# Patient Record
Sex: MALE | Race: WHITE | Employment: UNEMPLOYED | URBAN - METROPOLITAN AREA
[De-identification: names, ages, dates, MRNs, and addresses within clinical notes are randomized per-mention and may not be internally consistent; named-entity substitution may affect disease eponyms.]

---

## 2017-01-27 ENCOUNTER — ANTICOAGULATION THERAPY VISIT (OUTPATIENT)
Dept: NURSING | Facility: CLINIC | Age: 57
End: 2017-01-27
Payer: COMMERCIAL

## 2017-01-27 DIAGNOSIS — Z79.01 LONG-TERM (CURRENT) USE OF ANTICOAGULANTS: Primary | ICD-10-CM

## 2017-01-27 LAB — INR POINT OF CARE: 2.5 (ref 0.86–1.14)

## 2017-01-27 PROCEDURE — 36416 COLLJ CAPILLARY BLOOD SPEC: CPT

## 2017-01-27 PROCEDURE — 99207 ZZC NO CHARGE NURSE ONLY: CPT

## 2017-01-27 PROCEDURE — 85610 PROTHROMBIN TIME: CPT | Mod: QW

## 2017-01-27 NOTE — PROGRESS NOTES
ANTICOAGULATION FOLLOW-UP CLINIC VISIT    Patient Name:  Nikunj Anthony  Date:  1/27/2017  Contact Type:  Face to Face    SUBJECTIVE:     Patient Findings     Positives No Problem Findings           OBJECTIVE    INR PROTIME   Date Value Ref Range Status   01/27/2017 2.5* 0.86 - 1.14 Final       ASSESSMENT / PLAN  INR assessment THER    Recheck INR In: 6 WEEKS    INR Location Clinic      Anticoagulation Summary as of 1/27/2017     INR goal 2.0-3.0   Selected INR 2.5 (1/27/2017)   Maintenance plan 5 mg (5 mg x 1) every day   Full instructions 5 mg every day   Weekly total 35 mg   No change documented Leticia Antunez, KODY   Plan last modified Leticia Antunez RN (12/23/2016)   Next INR check 3/10/2017   Priority INR   Target end date Indefinite    Indications   Long-term (current) use of anticoagulants [Z79.01] [Z79.01]  Atrial fibrillation with rapid ventricular response (H) (Resolved) [I48.91]         Anticoagulation Episode Summary     INR check location     Preferred lab     Send INR reminders to [facility]    Comments       Anticoagulation Care Providers     Provider Role Specialty Phone number    Fred Del Valle MD [facility] [phone]            See the Encounter Report to view Anticoagulation Flowsheet and Dosing Calendar (Go to Encounters tab in chart review, and find the Anticoagulation Therapy Visit)        Leticia Antunez, RN

## 2017-03-10 DIAGNOSIS — I10 ESSENTIAL HYPERTENSION, BENIGN: ICD-10-CM

## 2017-03-10 DIAGNOSIS — I50.33 ACUTE ON CHRONIC DIASTOLIC CONGESTIVE HEART FAILURE (H): ICD-10-CM

## 2017-03-10 NOTE — LETTER
[address] [id number] [phone]          March 14, 2017    Nikunj Anthony                                                                                                                     [id number] [address] [facility] [facility] [address]            Dear Nikunj,    We recently received a call from your pharmacy requesting a refill of your medication (METOPROLOL).    A review of your chart indicates that an appointment is required.  Please contact our office at [phone] to schedule your ANNUAL doctor's appointment.    We have authorized one refill of your medication to allow time for you to schedule your appointment.    Taking care of your health is important to us and ongoing visits with your provider are vital to your care.  We look forward to seeing you in the near future.    Sincerely,    HORTENCIA CHINO MD/Mallika MARSHALL

## 2017-03-10 NOTE — TELEPHONE ENCOUNTER
metoprolol (LOPRESSOR) 50 MG tablet     Last Written Prescription Date: 12/20/16  Last Fill Quantity: 180, # refills: 2    Last Office Visit with GEOVANNA, URBANO or [facility] prescribing provider:  Ivon 3/25/16     Future Office Visit:        BP Readings from Last 3 Encounters:   06/17/16 105/71   04/28/16 118/74   03/28/16 100/72

## 2017-03-14 RX ORDER — METOPROLOL TARTRATE 50 MG
TABLET ORAL
Qty: 180 TABLET | Refills: 0 | Status: SHIPPED | OUTPATIENT
Start: 2017-03-14 | End: 2017-03-31

## 2017-03-14 NOTE — TELEPHONE ENCOUNTER
Due for annual visit, #30 sent, pt due for annual  Prescription approved per [facility] Refill Protocol.  Mallika Sharif, RN, BSN

## 2017-03-16 ENCOUNTER — ANTICOAGULATION THERAPY VISIT (OUTPATIENT)
Dept: NURSING | Facility: CLINIC | Age: 57
End: 2017-03-16
Payer: COMMERCIAL

## 2017-03-16 DIAGNOSIS — Z79.01 LONG-TERM (CURRENT) USE OF ANTICOAGULANTS: ICD-10-CM

## 2017-03-16 LAB — INR POINT OF CARE: 2.5 (ref 0.86–1.14)

## 2017-03-16 PROCEDURE — 36416 COLLJ CAPILLARY BLOOD SPEC: CPT

## 2017-03-16 PROCEDURE — 85610 PROTHROMBIN TIME: CPT | Mod: QW

## 2017-03-16 PROCEDURE — 99207 ZZC NO CHARGE NURSE ONLY: CPT

## 2017-03-16 NOTE — PROGRESS NOTES
ANTICOAGULATION FOLLOW-UP CLINIC VISIT    Patient Name:  Nikunj Anthony  Date:  3/16/2017  Contact Type:  Face to Face    SUBJECTIVE:     Patient Findings     Positives Other complaints (Had URI about 2 weeks ago, he took Coricidin prn)           OBJECTIVE    INR Protime   Date Value Ref Range Status   03/16/2017 2.5 (A) 0.86 - 1.14 Final       ASSESSMENT / PLAN  INR assessment THER    Recheck INR In: 6 WEEKS    INR Location Clinic      Anticoagulation Summary as of 3/16/2017     INR goal 2.0-3.0   Today's INR 2.5   Maintenance plan 5 mg (5 mg x 1) every day   Full instructions 5 mg every day   Weekly total 35 mg   No change documented Leticia Antunez RN   Plan last modified Leticia Antunez RN (12/23/2016)   Next INR check 4/27/2017   Priority INR   Target end date Indefinite    Indications   Long-term (current) use of anticoagulants [Z79.01] [Z79.01]  Atrial fibrillation with rapid ventricular response (H) (Resolved) [I48.91]         Anticoagulation Episode Summary     INR check location     Preferred lab     Send INR reminders to [facility]    Comments       Anticoagulation Care Providers     Provider Role Specialty Phone number    Fred Del Valle MD [facility] [phone]            See the Encounter Report to view Anticoagulation Flowsheet and Dosing Calendar (Go to Encounters tab in chart review, and find the Anticoagulation Therapy Visit)        Leticia Antunez, RN

## 2017-03-16 NOTE — MR AVS SNAPSHOT
Nikunj Gabrielle   3/16/2017 1:15 PM   Anticoagulation Therapy Visit    Description:  56 year old male   Provider:  [facility]   Department:  Cr Nurse           INR as of 3/16/2017     Today's INR 2.5      Anticoagulation Summary as of 3/16/2017     INR goal 2.0-3.0   Today's INR 2.5   Full instructions 5 mg every day   Next INR check 4/27/2017    Indications   Long-term (current) use of anticoagulants [Z79.01] [Z79.01]  Atrial fibrillation with rapid ventricular response (H) (Resolved) [I48.91]         Your next Anticoagulation Clinic appointment(s)     Apr 27, 2017  1:15 PM CDT   Anticoagulation Visit with [facility]   [facility]    [address] [place] [address] [phone]              Contact Numbers     Clinic Number:         March 2017 Details    Sun Mon Tue Wed Thu Fri Sat        1               2               3               4                 5               6               7               8               9               10               11                 12               13               14               15               16      5 mg   See details      17      5 mg         18      5 mg           19      5 mg         20      5 mg         21      5 mg         22      5 mg         23      5 mg         24      5 mg         25      5 mg           26      5 mg         27      5 mg         28      5 mg         29      5 mg         30      5 mg         31      5 mg           Date Details   03/16 This INR check               How to take your warfarin dose     To take:  5 mg Take 1 of the 5 mg tablets.           April 2017 Details    Sun Mon Tue Wed Thu Fri Sat           1      5 mg           2      5 mg         3      5 mg         4      5 mg         5      5 mg         6      5 mg         7      5 mg         8      5 mg           9      5 mg         10      5 mg         11      5 mg         12      5 mg          13      5 mg         14      5 mg         15      5 mg           16      5 mg         17      5 mg         18      5 mg         19      5 mg         20      5 mg         21      5 mg         22      5 mg           23      5 mg         24      5 mg         25      5 mg         26      5 mg         27            28               29                 30                      Date Details   No additional details    Date of next INR:  4/27/2017         How to take your warfarin dose     To take:  5 mg Take 1 of the 5 mg tablets.

## 2017-03-20 ENCOUNTER — TELEPHONE (OUTPATIENT)
Dept: NURSING | Facility: CLINIC | Age: 57
End: 2017-03-20

## 2017-03-20 DIAGNOSIS — I48.20 CHRONIC ATRIAL FIBRILLATION (H): ICD-10-CM

## 2017-03-20 RX ORDER — SIMVASTATIN 20 MG
20 TABLET ORAL AT BEDTIME
Qty: 30 TABLET | Refills: 0 | Status: SHIPPED | OUTPATIENT
Start: 2017-03-20 | End: 2017-03-31

## 2017-03-20 NOTE — TELEPHONE ENCOUNTER
Pt calls, simvastatin extended, pt agrees to make annual visit, plans on early April, will call back, due for annual  Prescription approved per [facility] Refill Protocol.  Mallika Sharif RN, BSN

## 2017-03-24 DIAGNOSIS — I10 BENIGN ESSENTIAL HYPERTENSION: ICD-10-CM

## 2017-03-24 RX ORDER — LISINOPRIL 10 MG/1
10 TABLET ORAL DAILY
Qty: 30 TABLET | Refills: 0 | Status: SHIPPED | OUTPATIENT
Start: 2017-03-24 | End: 2017-03-31

## 2017-03-24 NOTE — TELEPHONE ENCOUNTER
Lisinopril 10mg tab      Last Written Prescription Date: 03/30/16  Last Fill Quantity: 90, # refills: 3  Last Office Visit with G, P or [facility] prescribing provider: 03/25/16  Next 5 appointments (look out 90 days)     Mar 31, 2017  1:00 PM CDT   PHYSICAL with Fred Del Valle MD   [facility]    [address] [place] [id number] [phone]                   Potassium   Date Value Ref Range Status   04/28/2016 4.3 3.4 - 5.3 mmol/L Final     Creatinine   Date Value Ref Range Status   04/28/2016 1.21 0.66 - 1.25 mg/dL Final     BP Readings from Last 3 Encounters:   06/17/16 105/71   04/28/16 118/74   03/28/16 100/72

## 2017-03-24 NOTE — TELEPHONE ENCOUNTER
Medication is being filled for 1 time refill only due to:  Patient needs to be seen because due for an appt, has one scheduled and lab work will be due.

## 2017-03-31 ENCOUNTER — OFFICE VISIT (OUTPATIENT)
Dept: FAMILY MEDICINE | Facility: CLINIC | Age: 57
End: 2017-03-31
Payer: COMMERCIAL

## 2017-03-31 VITALS
HEART RATE: 71 BPM | RESPIRATION RATE: 16 BRPM | TEMPERATURE: 97.4 F | WEIGHT: 315 LBS | HEIGHT: 69 IN | DIASTOLIC BLOOD PRESSURE: 83 MMHG | BODY MASS INDEX: 46.65 KG/M2 | SYSTOLIC BLOOD PRESSURE: 123 MMHG | OXYGEN SATURATION: 97 %

## 2017-03-31 DIAGNOSIS — R73.9 HYPERGLYCEMIA: ICD-10-CM

## 2017-03-31 DIAGNOSIS — I10 ESSENTIAL HYPERTENSION, BENIGN: ICD-10-CM

## 2017-03-31 DIAGNOSIS — Z71.89 ADVANCED DIRECTIVES, COUNSELING/DISCUSSION: Primary | ICD-10-CM

## 2017-03-31 DIAGNOSIS — Z00.00 ROUTINE GENERAL MEDICAL EXAMINATION AT A HEALTH CARE FACILITY: ICD-10-CM

## 2017-03-31 DIAGNOSIS — I10 BENIGN ESSENTIAL HYPERTENSION: ICD-10-CM

## 2017-03-31 DIAGNOSIS — I48.20 CHRONIC ATRIAL FIBRILLATION (H): ICD-10-CM

## 2017-03-31 DIAGNOSIS — I70.90 ASVD (ARTERIOSCLEROTIC VASCULAR DISEASE): ICD-10-CM

## 2017-03-31 DIAGNOSIS — K29.00 OTHER ACUTE GASTRITIS: ICD-10-CM

## 2017-03-31 DIAGNOSIS — E78.5 HYPERLIPIDEMIA WITH TARGET LDL LESS THAN 100: ICD-10-CM

## 2017-03-31 DIAGNOSIS — Z79.01 LONG TERM CURRENT USE OF ANTICOAGULANT THERAPY: ICD-10-CM

## 2017-03-31 DIAGNOSIS — I50.33 ACUTE ON CHRONIC DIASTOLIC CONGESTIVE HEART FAILURE (H): ICD-10-CM

## 2017-03-31 LAB — HBA1C MFR BLD: 5.4 % (ref 4.3–6)

## 2017-03-31 PROCEDURE — 36415 COLL VENOUS BLD VENIPUNCTURE: CPT | Performed by: FAMILY MEDICINE

## 2017-03-31 PROCEDURE — 99396 PREV VISIT EST AGE 40-64: CPT | Performed by: FAMILY MEDICINE

## 2017-03-31 PROCEDURE — 80048 BASIC METABOLIC PNL TOTAL CA: CPT | Performed by: FAMILY MEDICINE

## 2017-03-31 PROCEDURE — 84460 ALANINE AMINO (ALT) (SGPT): CPT | Performed by: FAMILY MEDICINE

## 2017-03-31 PROCEDURE — 83036 HEMOGLOBIN GLYCOSYLATED A1C: CPT | Performed by: FAMILY MEDICINE

## 2017-03-31 PROCEDURE — 80061 LIPID PANEL: CPT | Performed by: FAMILY MEDICINE

## 2017-03-31 PROCEDURE — 82043 UR ALBUMIN QUANTITATIVE: CPT | Performed by: FAMILY MEDICINE

## 2017-03-31 RX ORDER — WARFARIN SODIUM 5 MG/1
TABLET ORAL
Qty: 30 TABLET | Refills: 5 | Status: SHIPPED | OUTPATIENT
Start: 2017-03-31 | End: 2017-05-03

## 2017-03-31 RX ORDER — TORSEMIDE 20 MG/1
20 TABLET ORAL DAILY
Qty: 90 TABLET | Refills: 3 | Status: SHIPPED | OUTPATIENT
Start: 2017-03-31 | End: 2018-06-07

## 2017-03-31 RX ORDER — SIMVASTATIN 20 MG
20 TABLET ORAL AT BEDTIME
Qty: 30 TABLET | Refills: 3 | Status: SHIPPED | OUTPATIENT
Start: 2017-03-31 | End: 2017-08-14

## 2017-03-31 RX ORDER — LISINOPRIL 10 MG/1
10 TABLET ORAL DAILY
Qty: 90 TABLET | Refills: 3 | Status: SHIPPED | OUTPATIENT
Start: 2017-03-31 | End: 2018-02-06

## 2017-03-31 RX ORDER — METOPROLOL TARTRATE 50 MG
TABLET ORAL
Qty: 180 TABLET | Refills: 3 | Status: SHIPPED | OUTPATIENT
Start: 2017-03-31 | End: 2017-06-13

## 2017-03-31 NOTE — LETTER
"[facility]  [address] [place], [address]  [phone]    April 3, 2017       Nikunj Anthony                                                                                                                                          [address] [place] [address]      Dear Nikunj:    The results of your latest lipid tests as attached. Your blood tests were good, Your weight is the biggest challenge for being healty,  I'd like to have our Health  [facility] call you to talk about little ways you can make a positive difference.     Results for orders placed or performed in visit on 03/31/17   Hemoglobin A1c   Result Value Ref Range    Hemoglobin A1C 5.4 4.3 - 6.0 %   Lipid Profile with reflex to direct LDL   Result Value Ref Range    Cholesterol 117 <200 mg/dL    Triglycerides 164 (H) <150 mg/dL    HDL Cholesterol 38 (L) >39 mg/dL    LDL Cholesterol Calculated 46 <100 mg/dL    Non HDL Cholesterol 79 <130 mg/dL   ALT   Result Value Ref Range    ALT 29 0 - 70 U/L   Basic metabolic panel  (Ca, Cl, CO2, Creat, Gluc, K, Na, BUN)   Result Value Ref Range    Sodium 143 133 - 144 mmol/L    Potassium 4.6 3.4 - 5.3 mmol/L    Chloride 110 (H) 94 - 109 mmol/L    Carbon Dioxide 25 20 - 32 mmol/L    Anion Gap 8 3 - 14 mmol/L    Glucose 97 70 - 99 mg/dL    Urea Nitrogen 31 (H) 7 - 30 mg/dL    Creatinine 1.38 (H) 0.66 - 1.25 mg/dL    GFR Estimate 53 (L) >60 mL/min/1.7m2    GFR Estimate If Black 64 >60 mL/min/1.7m2    Calcium 9.1 8.5 - 10.1 mg/dL   Albumin Random Urine Quantitative   Result Value Ref Range    Creatinine Urine 156 mg/dL    Albumin Urine mg/L 237 mg/L    Albumin Urine mg/g Cr 151.92 (H) 0 - 17 mg/g Cr     LDL is the \"bad\" cholesterol linked to heart disease and stroke.   HDL is the \"good\" cholesterol and when it is high, it decreases the risk for above problems.    Follow a low-fat, low-cholesterol diet and get regular exercise.  Please feel free to call the clinic at " [phone] if you have any questions.    Sincerely,    Fred Del Valle MD

## 2017-03-31 NOTE — PROGRESS NOTES
SUBJECTIVE:     CC: Nikunj Anthony is an 56 year old male who presents for preventative health visit.     Healthy Habits:    Do you get at least three servings of calcium containing foods daily (dairy, green leafy vegetables, etc.)? yes    Amount of exercise or daily activities, outside of work: 7 day(s) per week    Problems taking medications regularly No    Medication side effects: No    Have you had an eye exam in the past two years? no    Do you see a dentist twice per year? no    Do you have sleep apnea, excessive snoring or daytime drowsiness?no    Years of chronic health problems and cardiac disability. Lack of insight into any concept of weight management and his cardiac issues. He is medically disabled and has family living here in the [facility].    Patient Active Problem List   Diagnosis     Chronic atrial fibrillation (H)     Hyperlipidemia with target LDL less than 100     Acute kidney injury (H)     Advanced directives, counseling/discussion     ASVD (arteriosclerotic vascular disease)     Type 2 diabetes mellitus with diabetic chronic kidney disease (H)     Pulmonary edema     Essential hypertension, benign     Morbid obesity (H)     Health Care Home     Chronic systolic congestive heart failure (H)     Left ventricular diastolic dysfunction     Cardiomyopathy, nonischemic (H)     Long-term (current) use of anticoagulants [Z79.01]         Today's PHQ-2 Score:   PHQ-2 ( 1999 [employer]) 3/25/2016 3/1/2016   Q1: Little interest or pleasure in doing things 0 0   Q2: Feeling down, depressed or hopeless 0 0   PHQ-2 Score 0 0       Abuse: Current or Past(Physical, Sexual or Emotional)- No  Do you feel safe in your environment - Yes    Social History   Substance Use Topics     Smoking status: Former Smoker     Quit date: 1/1/1993     Smokeless tobacco: Never Used     Alcohol use No     none    Last PSA: No results found for: PSA    Recent Labs   Lab Test  02/24/16   0725  02/19/16   1136  12/18/14   1149   12/17/13   1034   CHOL  100   --   119  110   HDL  37*   --   51  42   LDL  49  54  51  52   TRIG  69   --   86  82   CHOLHDLRATIO   --    --   2.3  2.6   NHDL  63   --    --    --        Reviewed orders with patient. Reviewed health maintenance and updated orders accordingly - Yes    Reviewed and updated as needed this visit by clinical staff         Reviewed and updated as needed this visit by Provider            ROS:  C: NEGATIVE for fever, chills, change in weight  I: NEGATIVE for worrisome rashes, moles or lesions  E: NEGATIVE for vision changes or irritation  ENT: NEGATIVE for ear, mouth and throat problems  R: NEGATIVE for significant cough or SOB  CV: NEGATIVE for chest pain, palpitations or peripheral edema  GI: NEGATIVE for nausea, abdominal pain, heartburn, or change in bowel habits   male: negative for dysuria, hematuria, decreased urinary stream, erectile dysfunction, urethral discharge  M: NEGATIVE for significant arthralgias or myalgia  N: NEGATIVE for weakness, dizziness or paresthesias  H: NEGATIVE for bleeding problems  P: NEGATIVE for changes in mood or affect  CHRONIC EXZEMA       BP Readings from Last 3 Encounters:   03/31/17 123/83   06/17/16 105/71   04/28/16 118/74    Wt Readings from Last 3 Encounters:   03/31/17 (!) 356 lb 14.4 oz (161.9 kg)   06/17/16 (!) 353 lb (160.1 kg)   04/28/16 (!) 354 lb 11.2 oz (160.9 kg)                  OBJECTIVE:     There were no vitals taken for this visit.  EXAM:  GENERAL: healthy, alert and no distress  EYES: Eyes grossly normal to inspection, PERRL and conjunctivae and sclerae normal  HENT: ear canals and TM's normal, nose and mouth without ulcers or lesions  NECK: no adenopathy, no asymmetry, masses, or scars and thyroid normal to palpation  RESP: lungs clear to auscultation - no rales, rhonchi or wheezes  CV: regular rate and rhythm, normal S1 S2, no S3 or S4, no murmur, click or rub, no peripheral edema and peripheral pulses strong  ABDOMEN: soft,  nontender, no hepatosplenomegaly, no masses and bowel sounds normal   (male): normal male genitalia without lesions or urethral discharge, no hernia  RECTAL: normal sphincter tone, no rectal masses, prostate normal size, smooth, nontender without nodules or masses  MS: no gross musculoskeletal defects noted, no edema  SKIN: no suspicious lesions or rashes  NEURO: Normal strength and tone, mentation intact and speech normal  PSYCH: mentation appears normal, affect normal/bright    ASSESSMENT/PLAN:     1. Routine general medical examination at a health care facility  Lack of insight makes progress on his cardiac risk difficult    2. Advanced directives, counseling/discussion  Limited understanding, No care limitation    3. ASVD (arteriosclerotic vascular disease)  And chf with chronic atrial fib  - ASPIRIN NOT PRESCRIBED (INTENTIONAL); Please choose reason not prescribed, below  Dispense: 0 each; Refill: 0  - Lipid Profile with reflex to direct LDL  - Basic metabolic panel  (Ca, Cl, CO2, Creat, Gluc, K, Na, BUN)    4. Chronic atrial fibrillation (H)    - simvastatin (ZOCOR) 20 MG tablet; Take 1 tablet (20 mg) by mouth At Bedtime  Dispense: 30 tablet; Refill: 3    5. Hyperlipidemia with target LDL less than 100    - Lipid Profile with reflex to direct LDL  - ALT  - Basic metabolic panel  (Ca, Cl, CO2, Creat, Gluc, K, Na, BUN)    6. Hyperglycemia    - Hemoglobin A1c  - Albumin Random Urine Quantitative    7. Benign essential hypertension    - lisinopril (PRINIVIL/ZESTRIL) 10 MG tablet; Take 1 tablet (10 mg) by mouth daily  Dispense: 90 tablet; Refill: 3    8. Acute on chronic diastolic congestive heart failure (H)    - metoprolol (LOPRESSOR) 50 MG tablet; TAKE 3 TABLETS(150 MG) BY MOUTH TWICE DAILY  Dispense: 180 tablet; Refill: 3  - torsemide (DEMADEX) 20 MG tablet; Take 1 tablet (20 mg) by mouth daily  Dispense: 90 tablet; Refill: 3    9. Essential hypertension, benign    - metoprolol (LOPRESSOR) 50 MG tablet; TAKE  "3 TABLETS(150 MG) BY MOUTH TWICE DAILY  Dispense: 180 tablet; Refill: 3    10. Long term current use of anticoagulant therapy    - warfarin (COUMADIN) 5 MG tablet; Take 2.5mg on Wednesdays/ Take 5mg all other days of the week OR instructed by INR clinic  Dispense: 30 tablet; Refill: 5    11. Other acute gastritis  Stomach well behaved lately  - omeprazole (PRILOSEC) 20 MG CR capsule; Take 1 capsule (20 mg) by mouth daily  Dispense: 90 capsule; Refill: 3    COUNSELING:  Reviewed preventive health counseling, as reflected in patient instructions       Regular exercise       Healthy diet/nutrition       Vision screening       Hearing screening         reports that he quit smoking about 24 years ago. He has never used smokeless tobacco.    Estimated body mass index is 50.65 kg/(m^2) as calculated from the following:    Height as of 6/17/16: 5' 10\" (1.778 m).    Weight as of 6/17/16: 353 lb (160.1 kg).   Weight management plan: discussed low carbohydrate, high protein options, he's not inclined to change much     Counseling Resources:  ATP IV Guidelines  Pooled Cohorts Equation Calculator  FRAX Risk Assessment  ICSI Preventive Guidelines  Dietary Guidelines for Americans, 2010  USDA's MyPlate  ASA Prophylaxis  Lung CA Screening    Fred Del Valle MD  [facility]  "

## 2017-03-31 NOTE — MR AVS SNAPSHOT
After Visit Summary   3/31/2017    Nkiunj Anthony    MRN: [id number]           Patient Information     Date Of Birth          1960        Visit Information        Provider Department      3/31/2017 1:00 PM Fred Del Valle MD [facility]        Today's Diagnoses     Advanced directives, counseling/discussion    -  1    Routine general medical examination at a health care facility        ASVD (arteriosclerotic vascular disease)        Chronic atrial fibrillation (H)        Hyperlipidemia with target LDL less than 100        Hyperglycemia        Benign essential hypertension        Acute on chronic diastolic congestive heart failure (H)        Essential hypertension, benign        Long term current use of anticoagulant therapy        Other acute gastritis          Care Instructions      Preventive Health Recommendations  Male Ages 50 - 64    Yearly exam:             See your health care provider every year in order to  o   Review health changes.   o   Discuss preventive care.    o   Review your medicines if your doctor has prescribed any.     Have a cholesterol test every 5 years, or more frequently if you are at risk for high cholesterol/heart disease.     Have a diabetes test (fasting glucose) every three years. If you are at risk for diabetes, you should have this test more often.     Have a colonoscopy at age 50, or have a yearly FIT test (stool test). These exams will check for colon cancer.      Talk with your health care provider about whether or not a prostate cancer screening test (PSA) is right for you.    You should be tested each year for STDs (sexually transmitted diseases), if you re at risk.     Shots: Get a flu shot each year. Get a tetanus shot every 10 years.     Nutrition:    Eat at least 5 servings of fruits and vegetables daily.     Eat whole-grain bread, whole-wheat pasta and brown rice instead of white grains and rice.     Talk to your provider about  "Calcium and Vitamin D.     Lifestyle    Exercise for at least 150 minutes a week (30 minutes a day, 5 days a week). This will help you control your weight and prevent disease.     Limit alcohol to one drink per day.     No smoking.     Wear sunscreen to prevent skin cancer.     See your dentist every six months for an exam and cleaning.     See your eye doctor every 1 to 2 years.          Follow-ups after your visit        Your next 10 appointments already scheduled     Apr 27, 2017  1:15 PM CDT   Anticoagulation Visit with  ANTICOAGULATION CLINIC   [facility] ([facility]    [address] [place] [id number] [phone]              Who to contact     If you have questions or need follow up information about today's clinic visit or your schedule please contact [facility] directly at [phone].  Normal or non-critical lab and imaging results will be communicated to you by MyChart, letter or phone within 4 business days after the clinic has received the results. If you do not hear from us within 7 days, please contact the clinic through MyChart or phone. If you have a critical or abnormal lab result, we will notify you by phone as soon as possible.  Submit refill requests through [employer] or call your pharmacy and they will forward the refill request to us. Please allow 3 business days for your refill to be completed.          Additional Information About Your Visit        [employer] Information     [employer] lets you send messages to your doctor, view your test results, renew your prescriptions, schedule appointments and more. To sign up, go to [email or web address] . Click on \"Log in\" on the left side of the screen, which will take you to the Welcome page. Then click on \"Sign up Now\" on the right side of the page.     You will be asked to enter the access code listed below, as well as some personal information. Please follow the " "directions to create your username and password.     Your access code is: [email or web address]  Expires: 2017  1:40 PM     Your access code will  in 90 days. If you need help or a new code, please call your [facility] or [phone].        Care EveryWhere ID     This is your Care EveryWhere ID. This could be used by other organizations to access your [facility] medical records  [id number]        Your Vitals Were     Pulse Temperature Respirations Height Pulse Oximetry BMI (Body Mass Index)    71 97.4  F (36.3  C) (Oral) 16 5' 9.25\" (1.759 m) 97% 52.33 kg/m2       Blood Pressure from Last 3 Encounters:   17 123/83   16 105/71   16 118/74    Weight from Last 3 Encounters:   17 (!) 356 lb 14.4 oz (161.9 kg)   16 (!) 353 lb (160.1 kg)   16 (!) 354 lb 11.2 oz (160.9 kg)              We Performed the Following     Albumin Random Urine Quantitative     ALT     Basic metabolic panel  (Ca, Cl, CO2, Creat, Gluc, K, Na, BUN)     Hemoglobin A1c     Lipid Profile with reflex to direct LDL          Today's Medication Changes          These changes are accurate as of: 3/31/17  1:40 PM.  If you have any questions, ask your nurse or doctor.               These medicines have changed or have updated prescriptions.        Dose/Directions    metoprolol 50 MG tablet   Commonly known as:  LOPRESSOR   This may have changed:  See the new instructions.   Used for:  Acute on chronic diastolic congestive heart failure (H), Essential hypertension, benign   Changed by:  Fred Del Valle MD        TAKE 3 TABLETS(150 MG) BY MOUTH TWICE DAILY   Quantity:  180 tablet   Refills:  3            Where to get your medicines      These medications were sent to [facility] [address] [address]     Phone:  [phone]     lisinopril 10 MG tablet    metoprolol 50 MG tablet    omeprazole 20 MG CR capsule    simvastatin 20 MG tablet    " torsemide 20 MG tablet    warfarin 5 MG tablet                Primary Care Provider Office Phone # Fax #    Fred Ignacio Del Valle [phone]       [facility] [address] [facility] [address]        Goals        General    Medical (pt-stated)     Notes - Note created  2/29/2016  2:09 PM by Kelly Taylor, RN    weigh self daily and write down weight     As of today's date 2/29/2016 goal is met at 0 - 25%.   Goal Status:  Active           Weight    Weight below 200 lb (91 kg)       Thank you!     Thank you for choosing [facility]  for your care. Our goal is always to provide you with excellent care. Hearing back from our patients is one way we can continue to improve our services. Please take a few minutes to complete the written survey that you may receive in the mail after your visit with us. Thank you!             Your Updated Medication List - Protect others around you: Learn how to safely use, store and throw away your medicines at www.disposemymeds.org.          This list is accurate as of: 3/31/17  1:40 PM.  Always use your most recent med list.                   Brand Name Dispense Instructions for use    acetaminophen 650 MG 8 hour tablet     100 tablet    Take 650 mg by mouth every 4 hours as needed for mild pain       ASPIRIN NOT PRESCRIBED    INTENTIONAL    0 each    Please choose reason not prescribed, below       lisinopril 10 MG tablet    PRINIVIL/ZESTRIL    90 tablet    Take 1 tablet (10 mg) by mouth daily       metoprolol 50 MG tablet    LOPRESSOR    180 tablet    TAKE 3 TABLETS(150 MG) BY MOUTH TWICE DAILY       omeprazole 20 MG CR capsule    priLOSEC    90 capsule    Take 1 capsule (20 mg) by mouth daily       simvastatin 20 MG tablet    ZOCOR    30 tablet    Take 1 tablet (20 mg) by mouth At Bedtime       torsemide 20 MG tablet    DEMADEX    90 tablet    Take 1 tablet (20 mg) by mouth daily       * WARFARIN SODIUM PO      Take 2.5  mg by mouth Every Wednesday and 5 mg ROW       * warfarin 5 MG tablet    COUMADIN    30 tablet    Take 2.5mg on Wednesdays/ Take 5mg all other days of the week OR instructed by INR clinic       * Notice:  This list has 2 medication(s) that are the same as other medications prescribed for you. Read the directions carefully, and ask your doctor or other care provider to review them with you.

## 2017-03-31 NOTE — NURSING NOTE
"Chief Complaint   Patient presents with     Physical       Initial /83 (BP Location: Right arm, Patient Position: Chair, Cuff Size: Adult Large)  Pulse 71  Temp 97.4  F (36.3  C) (Oral)  Resp 16  Ht 5' 9.25\" (1.759 m)  Wt (!) 356 lb 14.4 oz (161.9 kg)  SpO2 97%  BMI 52.33 kg/m2 Estimated body mass index is 52.33 kg/(m^2) as calculated from the following:    Height as of this encounter: 5' 9.25\" (1.759 m).    Weight as of this encounter: 356 lb 14.4 oz (161.9 kg).  Medication Reconciliation: complete   Babatunde Meyers CMA       "

## 2017-04-01 LAB
ALT SERPL W P-5'-P-CCNC: 29 U/L (ref 0–70)
ANION GAP SERPL CALCULATED.3IONS-SCNC: 8 MMOL/L (ref 3–14)
BUN SERPL-MCNC: 31 MG/DL (ref 7–30)
CALCIUM SERPL-MCNC: 9.1 MG/DL (ref 8.5–10.1)
CHLORIDE SERPL-SCNC: 110 MMOL/L (ref 94–109)
CHOLEST SERPL-MCNC: 117 MG/DL
CO2 SERPL-SCNC: 25 MMOL/L (ref 20–32)
CREAT SERPL-MCNC: 1.38 MG/DL (ref 0.66–1.25)
CREAT UR-MCNC: 156 MG/DL
GFR SERPL CREATININE-BSD FRML MDRD: 53 ML/MIN/1.7M2
GLUCOSE SERPL-MCNC: 97 MG/DL (ref 70–99)
HDLC SERPL-MCNC: 38 MG/DL
LDLC SERPL CALC-MCNC: 46 MG/DL
MICROALBUMIN UR-MCNC: 237 MG/L
MICROALBUMIN/CREAT UR: 151.92 MG/G CR (ref 0–17)
NONHDLC SERPL-MCNC: 79 MG/DL
POTASSIUM SERPL-SCNC: 4.6 MMOL/L (ref 3.4–5.3)
SODIUM SERPL-SCNC: 143 MMOL/L (ref 133–144)
TRIGL SERPL-MCNC: 164 MG/DL

## 2017-04-18 DIAGNOSIS — I10 ESSENTIAL HYPERTENSION, BENIGN: ICD-10-CM

## 2017-04-18 DIAGNOSIS — I50.33 ACUTE ON CHRONIC DIASTOLIC CONGESTIVE HEART FAILURE (H): ICD-10-CM

## 2017-04-18 NOTE — TELEPHONE ENCOUNTER
Metoprolol     Last Written Prescription Date: 03/31/2017--[place]  Last Fill Quantity: 180, # refills: 3    Last Office Visit with G, P or [facility] prescribing provider:  03/31/2017-Dr Del Valle   Future Office Visit:        BP Readings from Last 3 Encounters:   03/31/17 123/83   06/17/16 105/71   04/28/16 118/74     Different pharmacy sending in this request

## 2017-04-20 RX ORDER — METOPROLOL TARTRATE 50 MG
TABLET ORAL
Qty: 180 TABLET | Refills: 0
Start: 2017-04-20

## 2017-04-20 NOTE — TELEPHONE ENCOUNTER
Contacted pt, staying with friends/family in [facility] now, rx sent there at last appt  Informed Regina Goldstein RN, BS  Clinical Nurse Triage.

## 2017-05-03 ENCOUNTER — ANTICOAGULATION THERAPY VISIT (OUTPATIENT)
Dept: NURSING | Facility: CLINIC | Age: 57
End: 2017-05-03
Payer: COMMERCIAL

## 2017-05-03 DIAGNOSIS — Z79.01 LONG-TERM (CURRENT) USE OF ANTICOAGULANTS: ICD-10-CM

## 2017-05-03 DIAGNOSIS — Z79.01 LONG TERM CURRENT USE OF ANTICOAGULANT THERAPY: ICD-10-CM

## 2017-05-03 LAB — INR POINT OF CARE: 2.3 (ref 0.9–1.1)

## 2017-05-03 PROCEDURE — 85610 PROTHROMBIN TIME: CPT | Mod: QW

## 2017-05-03 PROCEDURE — 36416 COLLJ CAPILLARY BLOOD SPEC: CPT

## 2017-05-03 PROCEDURE — 99207 ZZC NO CHARGE NURSE ONLY: CPT

## 2017-05-03 RX ORDER — WARFARIN SODIUM 5 MG/1
TABLET ORAL
Qty: 30 TABLET | Refills: 5 | COMMUNITY
Start: 2017-05-03 | End: 2018-06-07

## 2017-05-03 NOTE — MR AVS SNAPSHOT
Nikunj Gabrielle   5/3/2017 2:45 PM   Anticoagulation Therapy Visit    Description:  56 year old male   Provider:  [facility]   Department:   Nurse           INR as of 5/3/2017     Today's INR 2.3      Anticoagulation Summary as of 5/3/2017     INR goal 2.0-3.0   Today's INR 2.3   Full instructions 5 mg every day   Next INR check 6/16/2017    Indications   Long-term (current) use of anticoagulants [Z79.01] [Z79.01]  Atrial fibrillation with rapid ventricular response (H) (Resolved) [I48.91]         Your next Anticoagulation Clinic appointment(s)     Jun 16, 2017  1:15 PM CDT   Anticoagulation Visit with  ANTICOAGULATION CLINIC   [facility]    [address] [place] [id number] [phone]              Contact Numbers     [facility]  Please call to cancel and/or reschedule your appointment, or with any problems or questions regarding your therapy.  Anticoagulation Nurse: [phone]  Main Clinic: [phone]             May 2017 Details    Sun Mon Tue Wed Thu Fri Sat      1               2               3      5 mg   See details      4      5 mg         5      5 mg         6      5 mg           7      5 mg         8      5 mg         9      5 mg         10      5 mg         11      5 mg         12      5 mg         13      5 mg           14      5 mg         15      5 mg         16      5 mg         17      5 mg         18      5 mg         19      5 mg         20      5 mg           21      5 mg         22      5 mg         23      5 mg         24      5 mg         25      5 mg         26      5 mg         27      5 mg           28      5 mg         29      5 mg         30      5 mg         31      5 mg             Date Details   05/03 This INR check               How to take your warfarin dose     To take:  5 mg Take 1 of the 5 mg tablets.           June 2017 Details    Sun Mon Tue Wed Thu Fri Sat         1       5 mg         2      5 mg         3      5 mg           4      5 mg         5      5 mg         6      5 mg         7      5 mg         8      5 mg         9      5 mg         10      5 mg           11      5 mg         12      5 mg         13      5 mg         14      5 mg         15      5 mg         16            17                 18               19               20               21               22               23               24                 25               26               27               28               29               30                 Date Details   No additional details    Date of next INR:  6/16/2017         How to take your warfarin dose     To take:  5 mg Take 1 of the 5 mg tablets.

## 2017-05-03 NOTE — PROGRESS NOTES
ANTICOAGULATION FOLLOW-UP CLINIC VISIT    Patient Name:  Nikunj Anthony  Date:  5/3/2017  Contact Type:  Face to Face    SUBJECTIVE:     Patient Findings     Positives No Problem Findings           OBJECTIVE    INR Protime   Date Value Ref Range Status   05/03/2017 2.3  Final       ASSESSMENT / PLAN  INR assessment THER    Recheck INR In: 6 WEEKS    INR Location Clinic      Anticoagulation Summary as of 5/3/2017     INR goal 2.0-3.0   Today's INR 2.3   Maintenance plan 5 mg (5 mg x 1) every day   Full instructions 5 mg every day   Weekly total 35 mg   No change documented Sabiha Gutierres   Plan last modified Leticia Antunez RN (12/23/2016)   Next INR check 6/16/2017   Priority INR   Target end date Indefinite    Indications   Long-term (current) use of anticoagulants [Z79.01] [Z79.01]  Atrial fibrillation with rapid ventricular response (H) (Resolved) [I48.91]         Anticoagulation Episode Summary     INR check location     Preferred lab     Send INR reminders to [facility]    Comments       Anticoagulation Care Providers     Provider Role Specialty Phone number    Fred Del Valle MD [facility] [phone]            See the Encounter Report to view Anticoagulation Flowsheet and Dosing Calendar (Go to Encounters tab in chart review, and find the Anticoagulation Therapy Visit)    Sabiha Gutierres RN

## 2017-05-21 ENCOUNTER — APPOINTMENT (OUTPATIENT)
Dept: CT IMAGING | Facility: CLINIC | Age: 57
End: 2017-05-21
Attending: EMERGENCY MEDICINE
Payer: COMMERCIAL

## 2017-05-21 ENCOUNTER — HOSPITAL ENCOUNTER (EMERGENCY)
Facility: CLINIC | Age: 57
Discharge: HOME | End: 2017-05-21
Attending: EMERGENCY MEDICINE | Admitting: EMERGENCY MEDICINE
Payer: COMMERCIAL

## 2017-05-21 VITALS
RESPIRATION RATE: 20 BRPM | TEMPERATURE: 98.2 F | DIASTOLIC BLOOD PRESSURE: 97 MMHG | HEART RATE: 80 BPM | OXYGEN SATURATION: 100 % | SYSTOLIC BLOOD PRESSURE: 135 MMHG

## 2017-05-21 DIAGNOSIS — K11.20 SIALADENITIS: ICD-10-CM

## 2017-05-21 LAB
ANION GAP SERPL CALCULATED.3IONS-SCNC: 7 MMOL/L (ref 3–14)
BASOPHILS # BLD AUTO: 0 10E9/L (ref 0–0.2)
BASOPHILS NFR BLD AUTO: 0.5 %
BUN SERPL-MCNC: 31 MG/DL (ref 7–30)
CALCIUM SERPL-MCNC: 9.1 MG/DL (ref 8.5–10.1)
CHLORIDE SERPL-SCNC: 110 MMOL/L (ref 94–109)
CO2 SERPL-SCNC: 25 MMOL/L (ref 20–32)
CREAT SERPL-MCNC: 1.44 MG/DL (ref 0.66–1.25)
DIFFERENTIAL METHOD BLD: ABNORMAL
EOSINOPHIL # BLD AUTO: 0.2 10E9/L (ref 0–0.7)
EOSINOPHIL NFR BLD AUTO: 2.8 %
ERYTHROCYTE [DISTWIDTH] IN BLOOD BY AUTOMATED COUNT: 14 % (ref 10–15)
GFR SERPL CREATININE-BSD FRML MDRD: 51 ML/MIN/1.7M2
GLUCOSE SERPL-MCNC: 114 MG/DL (ref 70–99)
HCT VFR BLD AUTO: 39.3 % (ref 40–53)
HGB BLD-MCNC: 12.3 G/DL (ref 13.3–17.7)
IMM GRANULOCYTES # BLD: 0 10E9/L (ref 0–0.4)
IMM GRANULOCYTES NFR BLD: 0.5 %
INR PPP: 2.25 (ref 0.86–1.14)
LYMPHOCYTES # BLD AUTO: 1.1 10E9/L (ref 0.8–5.3)
LYMPHOCYTES NFR BLD AUTO: 17.7 %
MCH RBC QN AUTO: 31.5 PG (ref 26.5–33)
MCHC RBC AUTO-ENTMCNC: 31.3 G/DL (ref 31.5–36.5)
MCV RBC AUTO: 101 FL (ref 78–100)
MONOCYTES # BLD AUTO: 0.6 10E9/L (ref 0–1.3)
MONOCYTES NFR BLD AUTO: 9.1 %
NEUTROPHILS # BLD AUTO: 4.2 10E9/L (ref 1.6–8.3)
NEUTROPHILS NFR BLD AUTO: 69.4 %
NRBC # BLD AUTO: 0 10*3/UL
NRBC BLD AUTO-RTO: 0 /100
PLATELET # BLD AUTO: 158 10E9/L (ref 150–450)
POTASSIUM SERPL-SCNC: 4 MMOL/L (ref 3.4–5.3)
RBC # BLD AUTO: 3.91 10E12/L (ref 4.4–5.9)
SODIUM SERPL-SCNC: 142 MMOL/L (ref 133–144)
WBC # BLD AUTO: 6.1 10E9/L (ref 4–11)

## 2017-05-21 PROCEDURE — 70491 CT SOFT TISSUE NECK W/DYE: CPT

## 2017-05-21 PROCEDURE — 25000128 H RX IP 250 OP 636: Performed by: EMERGENCY MEDICINE

## 2017-05-21 PROCEDURE — 85610 PROTHROMBIN TIME: CPT | Performed by: EMERGENCY MEDICINE

## 2017-05-21 PROCEDURE — 96365 THER/PROPH/DIAG IV INF INIT: CPT | Mod: 59

## 2017-05-21 PROCEDURE — 85025 COMPLETE CBC W/AUTO DIFF WBC: CPT | Performed by: EMERGENCY MEDICINE

## 2017-05-21 PROCEDURE — 25000125 ZZHC RX 250: Performed by: EMERGENCY MEDICINE

## 2017-05-21 PROCEDURE — 99285 EMERGENCY DEPT VISIT HI MDM: CPT | Mod: 25

## 2017-05-21 PROCEDURE — 80048 BASIC METABOLIC PNL TOTAL CA: CPT | Performed by: EMERGENCY MEDICINE

## 2017-05-21 RX ORDER — AMOXICILLIN AND CLAVULANATE POTASSIUM 600; 42.9 MG/5ML; MG/5ML
875 POWDER, FOR SUSPENSION ORAL 2 TIMES DAILY
Qty: 146 ML | Refills: 0 | Status: SHIPPED | OUTPATIENT
Start: 2017-05-21 | End: 2017-05-31

## 2017-05-21 RX ORDER — IOPAMIDOL 755 MG/ML
500 INJECTION, SOLUTION INTRAVASCULAR ONCE
Status: COMPLETED | OUTPATIENT
Start: 2017-05-21 | End: 2017-05-21

## 2017-05-21 RX ORDER — AMPICILLIN AND SULBACTAM 2; 1 G/1; G/1
3 INJECTION, POWDER, FOR SOLUTION INTRAMUSCULAR; INTRAVENOUS ONCE
Status: COMPLETED | OUTPATIENT
Start: 2017-05-21 | End: 2017-05-21

## 2017-05-21 RX ORDER — LIDOCAINE 40 MG/G
CREAM TOPICAL
Status: DISCONTINUED | OUTPATIENT
Start: 2017-05-21 | End: 2017-05-21 | Stop reason: HOSPADM

## 2017-05-21 RX ADMIN — AMPICILLIN SODIUM AND SULBACTAM SODIUM 3 G: 2; 1 INJECTION, POWDER, FOR SOLUTION INTRAMUSCULAR; INTRAVENOUS at 11:14

## 2017-05-21 RX ADMIN — IOPAMIDOL 80 ML: 755 INJECTION, SOLUTION INTRAVENOUS at 10:26

## 2017-05-21 RX ADMIN — SODIUM CHLORIDE 65 ML: 9 INJECTION, SOLUTION INTRAVENOUS at 10:27

## 2017-05-21 ASSESSMENT — ENCOUNTER SYMPTOMS
VOICE CHANGE: 0
FACIAL SWELLING: 1
FEVER: 0
CARDIOVASCULAR NEGATIVE: 1
SORE THROAT: 0
RESPIRATORY NEGATIVE: 1

## 2017-05-21 NOTE — ED AVS SNAPSHOT
" [facility]    [address] [place] [address]    Phone:  [phone]    Fax:  [phone]                                       Nikunj Anthony   MRN: [id number]    Department:  [facility]   Date of Visit:  5/21/2017           Patient Information     Date Of Birth          1960        Your diagnoses for this visit were:     Sialadenitis        You were seen by Ahsan Sky MD.      Follow-up Information     Follow up with ENT.    Why:  for re-evaluation of your symptoms this week        Discharge Instructions         Salivary Gland Infection  Salivary glands make saliva. Saliva is mostly water. It also has minerals and proteins that help break down food and keep the mouth and teeth healthy. There are three pairs of salivary glands:    Parotid glands (in front of the ear)    Submandibular glands (below the jaw)    Sublingual glands (below the tongue)  A salivary gland can become infected by bacteria (germs). Things that make this more likely include dehydration and taking medicines that affect saliva flow. Infection is also more likely when the duct (channel) that carries saliva from the gland to the mouth is blocked. It may be blocked by a salivary gland \"stone.\" This is a collection of minerals that forms in the salivary gland.  Signs of infection include fever, severe pain in the gland, and redness and swelling over the gland. It may hurt to open the mouth. Symptoms may be worse when the flow of saliva is stimulated, such as by the smell of food.   Antibiotics are used to treat the infection. Drainage of the infection with a simple surgical procedure is sometimes needed. It a salivary gland stone is present, a procedure may be done to remove it.  Home Care:    Take antibiotics as directed until they are finished. Do this even if you start to feel better after only a few days.    Unless another medicine was " prescribed, take over-the-counter medicines, such as acetaminophen or ibuprofen, to help relieve pain.    Moist heat can also help relieve swelling and pain. Wet a cloth with warm water and put it over the affected gland for 10-15 minutes several times a day.    Gently massage the gland a few times a day.     Suck on lemon or other tart hard candies to encourage flow of saliva.  To help prevent blockages and infections:    Drink 6-8 glasses of fluid per day (such as water, tea, and clear soup) to keep well hydrated.    If you smoke, ask your healthcare provider for help to quit. Smoking makes salivary gland stones more likely.    Maintain good dental hygiene. Brush and floss your teeth daily. See your dentist for regular cleanings.  Follow-up care  Follow up with your healthcare provider or as advised.   When to seek medical advice  Call your healthcare provider if any of the following occur:    Fever over 100.4 F (38 C) after two days of taking antibiotics    Symptoms get worse or don't improve within a few days    Trouble breathing or swallowing    9017-5044 [employer]. [address] [place] [address]. All rights reserved. This information is not intended as a substitute for professional medical care. Always follow your healthcare professional's instructions.          Future Appointments        Provider Department Dept Phone Center    6/16/2017 1:15 PM [facility] [phone] [facility]      24 Hour Appointment Hotline       To make an appointment at any [facility], call [phone] ([phone]). If you don't have a family doctor or clinic, we will help you find one. [facility] are conveniently located to serve the needs of you and your family.             Review of your medicines      START taking        Dose / Directions Last dose taken    amoxicillin-clavulanate 600-42.9 MG/5ML suspension   Commonly known as:  AUGMENTIN-ES    Dose:  875 mg   Quantity:  146 mL        Take 7.3 mLs (875 mg) by mouth 2 times daily for 10 days   Refills:  0          Our records show that you are taking the medicines listed below. If these are incorrect, please call your family doctor or clinic.        Dose / Directions Last dose taken    acetaminophen 650 MG 8 hour tablet   Dose:  650 mg   Quantity:  100 tablet        Take 650 mg by mouth every 4 hours as needed for mild pain   Refills:  0        ASPIRIN NOT PRESCRIBED   Commonly known as:  INTENTIONAL   Quantity:  0 each        Please choose reason not prescribed, below   Refills:  0        lisinopril 10 MG tablet   Commonly known as:  PRINIVIL/ZESTRIL   Dose:  10 mg   Quantity:  90 tablet        Take 1 tablet (10 mg) by mouth daily   Refills:  3        metoprolol 50 MG tablet   Commonly known as:  LOPRESSOR   Quantity:  180 tablet        TAKE 3 TABLETS(150 MG) BY MOUTH TWICE DAILY   Refills:  3        omeprazole 20 MG CR capsule   Commonly known as:  priLOSEC   Dose:  20 mg   Quantity:  90 capsule        Take 1 capsule (20 mg) by mouth daily   Refills:  3        simvastatin 20 MG tablet   Commonly known as:  ZOCOR   Dose:  20 mg   Quantity:  30 tablet        Take 1 tablet (20 mg) by mouth At Bedtime   Refills:  3        torsemide 20 MG tablet   Commonly known as:  DEMADEX   Dose:  20 mg   Quantity:  90 tablet        Take 1 tablet (20 mg) by mouth daily   Refills:  3        * WARFARIN SODIUM PO   Dose:  2.5 mg        Take 2.5 mg by mouth Every Wednesday and 5 mg ROW   Refills:  0        * warfarin 5 MG tablet   Commonly known as:  COUMADIN   Quantity:  30 tablet        5mg qd OR instructed by INR clinic   Refills:  5        * Notice:  This list has 2 medication(s) that are the same as other medications prescribed for you. Read the directions carefully, and ask your doctor or other care provider to review them with you.            Prescriptions were sent or printed at these locations (1 Prescription)                    Other Prescriptions                Printed at Department/Unit printer (1 of 1)         amoxicillin-clavulanate (AUGMENTIN-ES) 600-42.9 MG/5ML suspension                Procedures and tests performed during your visit     Basic metabolic panel    CBC with platelets differential    INR    Soft tissue neck CT w contrast      Orders Needing Specimen Collection     None      Pending Results     Date and Time Order Name Status Description    5/21/2017 0938 Soft tissue neck CT w contrast Preliminary             Pending Culture Results     No orders found from 5/19/2017 to 5/22/2017.            Pending Results Instructions     If you had any lab results that were not finalized at the time of your Discharge, you can call the ED Lab Result RN at [phone]. You will be contacted by this team for any positive Lab results or changes in treatment. The nurses are available 7 days a week from 10A to 6:30P.  You can leave a message 24 hours per day and they will return your call.        Test Results From Your Hospital Stay        5/21/2017 10:13 AM      Component Results     Component Value Ref Range & Units Status    WBC 6.1 4.0 - 11.0 10e9/L Final    RBC Count 3.91 (L) 4.4 - 5.9 10e12/L Final    Hemoglobin 12.3 (L) 13.3 - 17.7 g/dL Final    Hematocrit 39.3 (L) 40.0 - 53.0 % Final     (H) 78 - 100 fl Final    MCH 31.5 26.5 - 33.0 pg Final    MCHC 31.3 (L) 31.5 - 36.5 g/dL Final    RDW 14.0 10.0 - 15.0 % Final    Platelet Count 158 150 - 450 10e9/L Final    Diff Method Automated Method  Final    % Neutrophils 69.4 % Final    % Lymphocytes 17.7 % Final    % Monocytes 9.1 % Final    % Eosinophils 2.8 % Final    % Basophils 0.5 % Final    % Immature Granulocytes 0.5 % Final    Nucleated RBCs 0 0 /100 Final    Absolute Neutrophil 4.2 1.6 - 8.3 10e9/L Final    Absolute Lymphocytes 1.1 0.8 - 5.3 10e9/L Final    Absolute Monocytes 0.6 0.0 - 1.3 10e9/L Final    Absolute Eosinophils 0.2 0.0 - 0.7 10e9/L Final    Absolute  Basophils 0.0 0.0 - 0.2 10e9/L Final    Abs Immature Granulocytes 0.0 0 - 0.4 10e9/L Final    Absolute Nucleated RBC 0.0  Final         5/21/2017 10:28 AM      Component Results     Component Value Ref Range & Units Status    Sodium 142 133 - 144 mmol/L Final    Potassium 4.0 3.4 - 5.3 mmol/L Final    Chloride 110 (H) 94 - 109 mmol/L Final    Carbon Dioxide 25 20 - 32 mmol/L Final    Anion Gap 7 3 - 14 mmol/L Final    Glucose 114 (H) 70 - 99 mg/dL Final    Urea Nitrogen 31 (H) 7 - 30 mg/dL Final    Creatinine 1.44 (H) 0.66 - 1.25 mg/dL Final    GFR Estimate 51 (L) >60 mL/min/1.7m2 Final    Non  GFR Calc    GFR Estimate If Black 61 >60 mL/min/1.7m2 Final    African American GFR Calc    Calcium 9.1 8.5 - 10.1 mg/dL Final         5/21/2017 10:50 AM      Narrative     CT OF THE NECK WITH CONTRAST  5/21/2017 10:34 AM     COMPARISON: None.    HISTORY: Right-sided swelling.  Foreign body sensation. Question pill  in hypopharynx.    TECHNIQUE:  Axial CT images of the neck were acquired after the  intravenous administration of 80 mL Isovue-370 nonionic iodinated  contrast material. Coronal reconstructions were created.    FINDINGS: There is soft tissue prominence of the mucosal surfaces of  the right side of the hypopharynx including the right aryepiglottic  fold. This appears to be inflammatory in nature as no discrete lesion  is identified; however, malignancy cannot be completely excluded.    The right submandibular gland is mildly prominent in size and  demonstrates mildly prominent abnormal contrast enhancement consistent  with inflammation (sialoadenitis). There is inflammatory fat stranding  in the deep and subcutaneous fat in the right neck inferior to the  right submandibular space.    There are no fluid collections or abscesses in the neck. The left  submandibular and bilateral parotid glands are unremarkable. The  thyroid gland is unremarkable. The lung apices are clear.    There is no sinusitis or  mastoiditis.        Impression     IMPRESSION:  1. Inflammatory changes of the right submandibular gland consistent  with sialoadenitis. No evidence for salivary duct stone.  2. Soft tissue thickening of the right side of the hypopharynx  including the right aryepiglottic fold, possibly inflammatory in  nature, but malignancy is not excluded. Clinical correlation  recommended.  3. No evidence for fluid collection or abscess in the neck.    Radiation dose for this scan was reduced using automated exposure  control, adjustment of the mA and/or kV according to patient size, or  iterative reconstruction technique.         5/21/2017 10:23 AM      Component Results     Component Value Ref Range & Units Status    INR 2.25 (H) 0.86 - 1.14 Final                Clinical Quality Measure: Blood Pressure Screening     Your blood pressure was checked while you were in the emergency department today. The last reading we obtained was  BP: (!) 139/102 . Please read the guidelines below about what these numbers mean and what you should do about them.  If your systolic blood pressure (the top number) is less than 120 and your diastolic blood pressure (the bottom number) is less than 80, then your blood pressure is normal. There is nothing more that you need to do about it.  If your systolic blood pressure (the top number) is 120-139 or your diastolic blood pressure (the bottom number) is 80-89, your blood pressure may be higher than it should be. You should have your blood pressure rechecked within a year by a primary care provider.  If your systolic blood pressure (the top number) is 140 or greater or your diastolic blood pressure (the bottom number) is 90 or greater, you may have high blood pressure. High blood pressure is treatable, but if left untreated over time it can put you at risk for heart attack, stroke, or kidney failure. You should have your blood pressure rechecked by a primary care provider within the next 4 weeks.  If  "your provider in the emergency department today gave you specific instructions to follow-up with your doctor or provider even sooner than that, you should follow that instruction and not wait for up to 4 weeks for your follow-up visit.        Thank you for choosing [facility]       Thank you for choosing [facility] for your care. Our goal is always to provide you with excellent care. Hearing back from our patients is one way we can continue to improve our services. Please take a few minutes to complete the written survey that you may receive in the mail after you visit with us. Thank you!        [employer] Information     [employer] lets you send messages to your doctor, view your test results, renew your prescriptions, schedule appointments and more. To sign up, go to [email or web address] . Click on \"Log in\" on the left side of the screen, which will take you to the Welcome page. Then click on \"Sign up Now\" on the right side of the page.     You will be asked to enter the access code listed below, as well as some personal information. Please follow the directions to create your username and password.     Your access code is: [email or web address]  Expires: 2017  1:40 PM     Your access code will  in 90 days. If you need help or a new code, please call your [facility] clinic or [phone].        Care EveryWhere ID     This is your Care EveryWhere ID. This could be used by other organizations to access your [facility] medical records  [id number]        After Visit Summary       This is your record. Keep this with you and show to your community pharmacist(s) and doctor(s) at your next visit.                  "

## 2017-05-21 NOTE — ED AVS SNAPSHOT
[facility]    [address]    [facility] [address]    Phone:  [phone]    Fax:  [phone]                                       Nikunj Anthony   MRN: [id number]    Department:  [facility]   Date of Visit:  5/21/2017           After Visit Summary Signature Page     I have received my discharge instructions, and my questions have been answered. I have discussed any challenges I see with this plan with the nurse or doctor.    ..........................................................................................................................................  Patient/Patient Representative Signature      ..........................................................................................................................................  Patient Representative Print Name and Relationship to Patient    ..................................................               ................................................  Date                                            Time    ..........................................................................................................................................  Reviewed by Signature/Title    ...................................................              ..............................................  Date                                                            Time

## 2017-05-21 NOTE — ED NOTES
Patient presents to the ED with facial swelling. Reports that overnight developed swelling to the right side of neck. Attempted to take a tylenol, but feels like it got stuck in his swollen throat. Additionally reports bilateral ear drainage. States this is not necessarily new because he digs in his ears a lot due to itching.

## 2017-05-21 NOTE — ED PROVIDER NOTES
History     Chief Complaint:  Facial Swelling      HPI   Nikunj Anthony is a 56 year old male who presents with complaints of acute right submandibular swelling noted this am.  No symptoms last evening, and no recent febrile illness, odontalgia, sore throat, or history of trauma or prior swelling.  He took a tylenol pill thinking this might help, and now feels as though it is lodged in his hypopharynx.  No difficulty swallowing or breathing.      Allergies:  No Known Allergies     Medications:      warfarin (COUMADIN) 5 MG tablet   ASPIRIN NOT PRESCRIBED (INTENTIONAL)   lisinopril (PRINIVIL/ZESTRIL) 10 MG tablet   simvastatin (ZOCOR) 20 MG tablet   metoprolol (LOPRESSOR) 50 MG tablet   torsemide (DEMADEX) 20 MG tablet   omeprazole (PRILOSEC) 20 MG CR capsule   WARFARIN SODIUM PO   acetaminophen 650 MG TABS       Problem List:    Patient Active Problem List    Diagnosis Date Noted     Long-term (current) use of anticoagulants [Z79.01] 03/25/2016     Priority: Medium     Left ventricular diastolic dysfunction      Priority: Medium     Cardiomyopathy, nonischemic (H)      Priority: Medium     Chronic systolic congestive heart failure (H)      Priority: Medium     Essential hypertension, benign 10/30/2015     Priority: Medium     Morbid obesity (H) 10/30/2015     Priority: Medium     ASVD (arteriosclerotic vascular disease) 08/17/2015     Priority: Medium     cardiac cath 2016: minimal disease       Type 2 diabetes mellitus with diabetic chronic kidney disease (H) 08/17/2015     Priority: Medium     Pulmonary edema 08/17/2015     Priority: Medium     Hyperlipidemia with target LDL less than 100 01/27/2014     Priority: Medium     Diagnosis updated by automated process. Provider to review and confirm.       Chronic atrial fibrillation (H) 12/18/2013     Priority: Medium     Health Care Home 02/29/2016       Status:  Closed - unable to contact   Care Coordinator:  Kelly Taylor [phone]  See Letters for HC  Care Plan  Date:  April 20, 2016             Advanced directives, counseling/discussion 05/14/2015     Advance Care Planning:   Receipt of ACP document:  Received: Health Care Directive which was witnessed or notarized on 5/13/2015.  Document not previously scanned.  Validation form completed and sent with document to be scanned.  Code Status reflects choices in most recent ACP document.  Confirmed/documented designated decision maker(s). See permanent comments section of demographics in clinical tab. View document(s) and details by clicking on code status.   Added by Xander Stanley on 5/14/2015.           Acute kidney injury (H) 05/11/2015        Past Medical History:    Past Medical History:   Diagnosis Date     Atherosclerotic heart disease      Atrial fibrillation (H)      Cardiomyopathy, nonischemic (H)      Chronic systolic congestive heart failure (H)      Diabetes mellitus      High cholesterol      Hypertension      Left ventricular diastolic dysfunction        Past Surgical History:    Past Surgical History:   Procedure Laterality Date     ANGIOGRAM  02/25/2016    Minimal coronary artery disease and no stenosis greater than 10% to at most 20%. Nonischemic cardiomyopathy, ejection fraction estimated to be 30%. Left ventricular end-diastolic pressure of 40 mmHg. No significant mitral regurgitation. Recommendations: Maximal medical management. Daily exercise and weight loss     COMBINED CYSTOSCOPY, RETROGRADES, URETEROSCOPY, INSERT STENT Left 5/13/2015    Procedure: COMBINED CYSTOSCOPY, RETROGRADES, URETEROSCOPY, INSERT STENT;  Surgeon: Jasen Medina MD;  Location:  OR       Family History:   Family History   Problem Relation Age of Onset     CANCER Sister        Social History:  Marital Status:  Single [1]  Social History   Substance Use Topics     Smoking status: Former Smoker     Quit date: 1/1/1993     Smokeless tobacco: Never Used     Alcohol use No        Review of Systems    Constitutional: Negative for fever.   HENT: Positive for facial swelling and mouth sores. Negative for dental problem, drooling, postnasal drip, sore throat and voice change.    Respiratory: Negative.    Cardiovascular: Negative.    All other systems reviewed and are negative.    Physical Exam   First Vitals:  BP: (!) 133/94  Pulse: 82  Temp: 98.2  F (36.8  C)  Resp: 16  SpO2: 98 %    Physical Exam  General: Patient is alert and cooperative.  Overweight (over 350 lbs.)  HENT: generally poor dentition.  Numerous missing teeth.  No obvious current significant decay or tooth tenderness.  Normal appearing posterior pharynx, tongue, and floor of mouth.  No gingival or buccal swelling or induration.    Eyes: EOMI, PERRLA.  Normal conjunctiva.  Neck: thick neck, marked redundant soft tissue anterior portion.  Mildly tender, firm swelling right side.  Midline trachea, no subcutaneous air, no stridor.  Cardiovascular: Normal rate, regular rhythm and normal heart sounds.   Pulmonary/Chest: Effort normal.  No wheezing or crackles.  Abdominal: Soft. Patient exhibits no distension and no mass. There is no tenderness.       Musculoskeletal: Normal range of motion.   Neurological: Patient  is alert and oriented. Coordination normal, grossly intact.  Skin: Skin is warm and dry. No rash noted. No skin erythema to neck.  Psychiatric: Patient has a normal mood and affect. Normal behavior and judgement.    Emergency Department Course     Imaging:  CT soft tissue neck w/ contrast:  IMPRESSION:  1. Inflammatory changes of the right submandibular gland consistent  with sialoadenitis. No evidence for salivary duct stone.  2. Soft tissue thickening of the right side of the hypopharynx  including the right aryepiglottic fold, possibly inflammatory in  nature, but malignancy is not excluded. Clinical correlation  recommended.  3. No evidence for fluid collection or abscess in the neck.     Laboratory:  INR: 2.25  CBC: wbc 6.1  BMP:  electrolytes wnl.  BUN/Cr: .44    Procedures:    Interventions:  Peripheral IV placed.  Unasyn 3 g IV x 1    Emergency Department Course:  Discussed results, plan, f/u recommendations.    Impression & Plan    Medical Decision Makin year old male with acute right submandibular swelling with CT evidence of sialadenitis without sialolithiasis.  Additionally, he has right throat pain since he had difficulty swallowing a tylenol pill earlier this morning.  He is not febrile nor having any difficulty swallowing or breathing.  CT demonstrates no retained pill but what appears to be inflammatory changes in the aryepiglottic region, I suspect related to pill inflammation.   See above for formal CT report, which was provided to Nikunj.  Antibiotics and early ENT f/u for further examination and management are recommended.  He was medicated with IV Unasyn and d/c'd with a course of Augmentin.    Diagnosis:  1. Right sialadenitis.  2. Right aryepiglottic inflammation, r/o mass/malignancy.    Disposition:  Home.    Discharge Medications:  Augmentin     Ahsan Sky  2017   [facility] EMERGENCY DEPARTMENT       Ahsan Sky MD  17 1532

## 2017-05-21 NOTE — DISCHARGE INSTRUCTIONS
"  Salivary Gland Infection  Salivary glands make saliva. Saliva is mostly water. It also has minerals and proteins that help break down food and keep the mouth and teeth healthy. There are three pairs of salivary glands:    Parotid glands (in front of the ear)    Submandibular glands (below the jaw)    Sublingual glands (below the tongue)  A salivary gland can become infected by bacteria (germs). Things that make this more likely include dehydration and taking medicines that affect saliva flow. Infection is also more likely when the duct (channel) that carries saliva from the gland to the mouth is blocked. It may be blocked by a salivary gland \"stone.\" This is a collection of minerals that forms in the salivary gland.  Signs of infection include fever, severe pain in the gland, and redness and swelling over the gland. It may hurt to open the mouth. Symptoms may be worse when the flow of saliva is stimulated, such as by the smell of food.   Antibiotics are used to treat the infection. Drainage of the infection with a simple surgical procedure is sometimes needed. It a salivary gland stone is present, a procedure may be done to remove it.  Home Care:    Take antibiotics as directed until they are finished. Do this even if you start to feel better after only a few days.    Unless another medicine was prescribed, take over-the-counter medicines, such as acetaminophen or ibuprofen, to help relieve pain.    Moist heat can also help relieve swelling and pain. Wet a cloth with warm water and put it over the affected gland for 10-15 minutes several times a day.    Gently massage the gland a few times a day.     Suck on lemon or other tart hard candies to encourage flow of saliva.  To help prevent blockages and infections:    Drink 6-8 glasses of fluid per day (such as water, tea, and clear soup) to keep well hydrated.    If you smoke, ask your healthcare provider for help to quit. Smoking makes salivary gland stones more " likely.    Maintain good dental hygiene. Brush and floss your teeth daily. See your dentist for regular cleanings.  Follow-up care  Follow up with your healthcare provider or as advised.   When to seek medical advice  Call your healthcare provider if any of the following occur:    Fever over 100.4 F (38 C) after two days of taking antibiotics    Symptoms get worse or don't improve within a few days    Trouble breathing or swallowing    1422-9348 [employer]. [address] [place] [address], [place] [address]. All rights reserved. This information is not intended as a substitute for professional medical care. Always follow your healthcare professional's instructions.

## 2017-05-25 ENCOUNTER — ANTICOAGULATION THERAPY VISIT (OUTPATIENT)
Dept: NURSING | Facility: CLINIC | Age: 57
End: 2017-05-25
Payer: COMMERCIAL

## 2017-05-25 DIAGNOSIS — Z79.01 LONG-TERM (CURRENT) USE OF ANTICOAGULANTS: ICD-10-CM

## 2017-05-25 LAB — INR POINT OF CARE: 3.2 (ref 0.86–1.14)

## 2017-05-25 PROCEDURE — 36416 COLLJ CAPILLARY BLOOD SPEC: CPT

## 2017-05-25 PROCEDURE — 99207 ZZC NO CHARGE NURSE ONLY: CPT

## 2017-05-25 PROCEDURE — 85610 PROTHROMBIN TIME: CPT | Mod: QW

## 2017-05-25 NOTE — PROGRESS NOTES
ANTICOAGULATION FOLLOW-UP CLINIC VISIT    Patient Name:  Nikunj Anthony  Date:  5/25/2017  Contact Type:  Face to Face    SUBJECTIVE:     Patient Findings     Positives Antibiotic use or infection (Started Augmentin on 05/21/17 for )    Comments Patient living about 2 hours away so difficult to come to INR clinic more than every 4 weeks.  Patient has not looked into medical facilities near him.           OBJECTIVE    INR Protime   Date Value Ref Range Status   05/25/2017 3.2 (A) 0.86 - 1.14 Final       ASSESSMENT / PLAN  INR assessment SUPRA    Recheck INR In: 4 WEEKS    INR Location Clinic      Anticoagulation Summary as of 5/25/2017     INR goal 2.0-3.0   Today's INR 3.2!   Maintenance plan 5 mg (5 mg x 1) every day   Full instructions 5/25: 2.5 mg; Otherwise 5 mg every day   Weekly total 35 mg   Plan last modified Leticia Antunez RN (12/23/2016)   Next INR check 6/23/2017   Priority INR   Target end date Indefinite    Indications   Long-term (current) use of anticoagulants [Z79.01] [Z79.01]  Atrial fibrillation with rapid ventricular response (H) (Resolved) [I48.91]         Anticoagulation Episode Summary     INR check location     Preferred lab     Send INR reminders to [facility]    Comments       Anticoagulation Care Providers     Provider Role Specialty Phone number    Fred Del Valle MD [facility] [phone]            See the Encounter Report to view Anticoagulation Flowsheet and Dosing Calendar (Go to Encounters tab in chart review, and find the Anticoagulation Therapy Visit)        Leticia Antunez RN

## 2017-05-25 NOTE — MR AVS SNAPSHOT
Nikunj Gabrielle   5/25/2017 3:30 PM   Anticoagulation Therapy Visit    Description:  56 year old male   Provider:  [facility]   Department:  [facility] Nurse           INR as of 5/25/2017     Today's INR 3.2!      Anticoagulation Summary as of 5/25/2017     INR goal 2.0-3.0   Today's INR 3.2!   Full instructions 5/25: 2.5 mg; Otherwise 5 mg every day   Next INR check 6/23/2017    Indications   Long-term (current) use of anticoagulants [Z79.01] [Z79.01]  Atrial fibrillation with rapid ventricular response (H) (Resolved) [I48.91]         Your next Anticoagulation Clinic appointment(s)     Jun 23, 2017  1:30 PM CDT   Anticoagulation Visit with [facility]   [facility] ([facility]    [address] [place] [id number]   [phone]              Contact Numbers     Clinic Number:         May 2017 Details    Sun Mon Tue Wed Thu Fri Sat      1               2               3               4               5               6                 7               8               9               10               11               12               13                 14               15               16               17               18               19               20                 21               22               23               24               25      2.5 mg   See details      26      5 mg         27      5 mg           28      5 mg         29      5 mg         30      5 mg         31      5 mg             Date Details   05/25 This INR check               How to take your warfarin dose     To take:  2.5 mg Take 0.5 of a 5 mg tablet.    To take:  5 mg Take 1 of the 5 mg tablets.           June 2017 Details    Sun Mon Tue Wed Thu Fri Sat         1      5 mg         2      5 mg         3      5 mg           4      5 mg         5      5 mg         6      5 mg         7      5 mg         8      5 mg         9      5 mg         10      5 mg            11      5 mg         12      5 mg         13      5 mg         14      5 mg         15      5 mg         16      5 mg         17      5 mg           18      5 mg         19      5 mg         20      5 mg         21      5 mg         22      5 mg         23            24                 25               26               27               28               29               30                 Date Details   No additional details    Date of next INR:  6/23/2017         How to take your warfarin dose     To take:  5 mg Take 1 of the 5 mg tablets.

## 2017-06-13 DIAGNOSIS — I50.33 ACUTE ON CHRONIC DIASTOLIC CONGESTIVE HEART FAILURE (H): ICD-10-CM

## 2017-06-13 DIAGNOSIS — I10 ESSENTIAL HYPERTENSION, BENIGN: ICD-10-CM

## 2017-06-13 NOTE — TELEPHONE ENCOUNTER
Metoprolol Tartrate 50 mg tab       Last Written Prescription Date: 03/03/17  Last Fill Quantity: 180, # refills: 3    Last Office Visit with [facility] or [facility] prescribing provider:  03/31/17 Dr. Del Valle    Future Office Visit:    Next 5 appointments (look out 90 days)     Jun 23, 2017  2:45 PM CDT   Return Visit with Wale Fischer MD   [facility] [place] [facility])    [address] [address] [address] [phone]                    BP Readings from Last 3 Encounters:   05/21/17 (!) 135/97   03/31/17 123/83   06/17/16 105/71

## 2017-06-15 RX ORDER — METOPROLOL TARTRATE 50 MG
TABLET ORAL
Qty: 180 TABLET | Refills: 2 | Status: SHIPPED | OUTPATIENT
Start: 2017-06-15 | End: 2017-10-04

## 2017-06-23 ENCOUNTER — ANTICOAGULATION THERAPY VISIT (OUTPATIENT)
Dept: NURSING | Facility: CLINIC | Age: 57
End: 2017-06-23
Payer: COMMERCIAL

## 2017-06-23 ENCOUNTER — OFFICE VISIT (OUTPATIENT)
Dept: CARDIOLOGY | Facility: CLINIC | Age: 57
End: 2017-06-23
Attending: INTERNAL MEDICINE
Payer: COMMERCIAL

## 2017-06-23 VITALS
HEART RATE: 80 BPM | SYSTOLIC BLOOD PRESSURE: 130 MMHG | HEIGHT: 69 IN | WEIGHT: 315 LBS | BODY MASS INDEX: 46.65 KG/M2 | DIASTOLIC BLOOD PRESSURE: 84 MMHG

## 2017-06-23 DIAGNOSIS — Z79.01 LONG-TERM (CURRENT) USE OF ANTICOAGULANTS: ICD-10-CM

## 2017-06-23 DIAGNOSIS — I42.8 CARDIOMYOPATHY, NONISCHEMIC (H): Primary | ICD-10-CM

## 2017-06-23 DIAGNOSIS — I48.20 CHRONIC ATRIAL FIBRILLATION (H): ICD-10-CM

## 2017-06-23 LAB — INR POINT OF CARE: 2.8 (ref 0.86–1.14)

## 2017-06-23 PROCEDURE — 99207 ZZC NO CHARGE NURSE ONLY: CPT

## 2017-06-23 PROCEDURE — 85610 PROTHROMBIN TIME: CPT | Mod: QW

## 2017-06-23 PROCEDURE — 36416 COLLJ CAPILLARY BLOOD SPEC: CPT

## 2017-06-23 PROCEDURE — 99213 OFFICE O/P EST LOW 20 MIN: CPT | Performed by: INTERNAL MEDICINE

## 2017-06-23 NOTE — MR AVS SNAPSHOT
After Visit Summary   6/23/2017    Nikunj Anthony    MRN: [id number]           Patient Information     Date Of Birth          1960        Visit Information        Provider Department      6/23/2017 2:45 PM Wale Fischer MD [facility]        Today's Diagnoses     Cardiomyopathy, nonischemic (H)    -  1    Chronic atrial fibrillation (H)           Follow-ups after your visit        Additional Services     Follow-Up with Cardiologist                 Your next 10 appointments already scheduled     Aug 04, 2017  2:00 PM CDT   Anticoagulation Visit with [facility]   [facility]    [address] [place] [id number] [phone]              Future tests that were ordered for you today     Open Future Orders        Priority Expected Expires Ordered    Follow-Up with Cardiologist Routine 6/23/2018 11/5/2018 6/23/2017            Who to contact     If you have questions or need follow up information about today's clinic visit or your schedule please contact [facility] directly at [phone].  Normal or non-critical lab and imaging results will be communicated to you by [employer], letter or phone within 4 business days after the clinic has received the results. If you do not hear from us within 7 days, please contact the clinic through [employer] or phone. If you have a critical or abnormal lab result, we will notify you by phone as soon as possible.  Submit refill requests through [employer] or call your pharmacy and they will forward the refill request to us. Please allow 3 business days for your refill to be completed.          Additional Information About Your Visit        MyChart Information     [employer] lets you send messages to your doctor, view your test results, renew your prescriptions, schedule appointments and more. To sign up, go to  [email or web address] . Click on \"Log in\" on the left side of the screen, which will take you to the Welcome page. Then click on \"Sign up Now\" on the right side of the page.     You will be asked to enter the access code listed below, as well as some personal information. Please follow the directions to create your username and password.     Your access code is: [email or web address]  Expires: 2017  1:40 PM     Your access code will  in 90 days. If you need help or a new code, please call your [facility] clinic or [phone].        Care EveryWhere ID     This is your Care EveryWhere ID. This could be used by other organizations to access your [facility] medical records  [id number]        Your Vitals Were     Pulse Height BMI (Body Mass Index)             80 1.759 m (5' 9.25\") 52.91 kg/m2          Blood Pressure from Last 3 Encounters:   17 130/84   17 (!) 135/97   17 123/83    Weight from Last 3 Encounters:   17 (!) 163.7 kg (360 lb 14.4 oz)   17 (!) 161.9 kg (356 lb 14.4 oz)   16 (!) 160.1 kg (353 lb)              We Performed the Following     Follow-Up with Cardiologist        Primary Care Provider Office Phone # Fax #    Fred Ignacio Del Valle [phone]       [address] [address]        Goals        General    Medical (pt-stated)     Notes - Note created  2016  2:09 PM by Kelly Taylor, RN    weigh self daily and write down weight     As of today's date 2016 goal is met at 0 - 25%.   Goal Status:  Active           Weight    Weight below 91 kg (200 lb)       Equal Access to Services     ANGELINA WARREN : Delmi Mora, tanisha peralta, eda gregg . [facility] [phone].    ATENCIÓN: Si habla español, tiene a wynn disposición servicios gratuitos de asistencia lingüística. Llame al [phone].    We comply with applicable " federal civil rights laws and [place] laws. We do not discriminate on the basis of race, color, national origin, age, disability sex, sexual orientation or gender identity.            Thank you!     Thank you for choosing [facility] [place]  for your care. Our goal is always to provide you with excellent care. Hearing back from our patients is one way we can continue to improve our services. Please take a few minutes to complete the written survey that you may receive in the mail after your visit with us. Thank you!             Your Updated Medication List - Protect others around you: Learn how to safely use, store and throw away your medicines at www.disposemymeds.org.          This list is accurate as of: 6/23/17  2:51 PM.  Always use your most recent med list.                   Brand Name Dispense Instructions for use Diagnosis    acetaminophen 650 MG 8 hour tablet     100 tablet    Take 650 mg by mouth every 4 hours as needed for mild pain    Renal colic       ASPIRIN NOT PRESCRIBED    INTENTIONAL    0 each    Please choose reason not prescribed, below    ASVD (arteriosclerotic vascular disease)       lisinopril 10 MG tablet    PRINIVIL/ZESTRIL    90 tablet    Take 1 tablet (10 mg) by mouth daily    Benign essential hypertension       metoprolol 50 MG tablet    LOPRESSOR    180 tablet    TAKE 3 TABLETS(150 MG) BY MOUTH TWICE DAILY    Acute on chronic diastolic congestive heart failure (H), Essential hypertension, benign       omeprazole 20 MG CR capsule    priLOSEC    90 capsule    Take 1 capsule (20 mg) by mouth daily    Other acute gastritis       simvastatin 20 MG tablet    ZOCOR    30 tablet    Take 1 tablet (20 mg) by mouth At Bedtime    Chronic atrial fibrillation (H)       torsemide 20 MG tablet    DEMADEX    90 tablet    Take 1 tablet (20 mg) by mouth daily    Acute on chronic diastolic congestive heart failure (H)       * WARFARIN SODIUM PO      Take 2.5 mg by mouth  Every Wednesday and 5 mg ROW        * warfarin 5 MG tablet    COUMADIN    30 tablet    5mg qd OR instructed by INR clinic    Long term current use of anticoagulant therapy       * Notice:  This list has 2 medication(s) that are the same as other medications prescribed for you. Read the directions carefully, and ask your doctor or other care provider to review them with you.

## 2017-06-23 NOTE — PROGRESS NOTES
ANTICOAGULATION FOLLOW-UP CLINIC VISIT    Patient Name:  Nikunj Anthony  Date:  6/23/2017  Contact Type:  Face to Face    SUBJECTIVE:     Patient Findings     Positives No Problem Findings           OBJECTIVE    INR Protime   Date Value Ref Range Status   06/23/2017 2.8 (A) 0.86 - 1.14 Final       ASSESSMENT / PLAN  INR assessment THER    Recheck INR In: 6 WEEKS    INR Location Clinic      Anticoagulation Summary as of 6/23/2017     INR goal 2.0-3.0   Today's INR 2.8   Maintenance plan 5 mg (5 mg x 1) every day   Full instructions 5 mg every day   Weekly total 35 mg   No change documented Leticia Antunez, KODY   Plan last modified Leticia Antunez RN (12/23/2016)   Next INR check 8/4/2017   Priority INR   Target end date Indefinite    Indications   Long-term (current) use of anticoagulants [Z79.01] [Z79.01]  Atrial fibrillation with rapid ventricular response (H) (Resolved) [I48.91]         Anticoagulation Episode Summary     INR check location     Preferred lab     Send INR reminders to [facility]    Comments       Anticoagulation Care Providers     Provider Role Specialty Phone number    Fred Del Valle MD [facility] [phone]            See the Encounter Report to view Anticoagulation Flowsheet and Dosing Calendar (Go to Encounters tab in chart review, and find the Anticoagulation Therapy Visit)        Leticia Antunez, RN

## 2017-06-23 NOTE — LETTER
6/23/2017    [place]  [address] [facility] [address]    RE: Nikunj Anthony       Dear Colleague,    I had the pleasure of seeing Nikunj Anthony in the [facility].    Mr. Anthony is a pleasant, 56-year-old gentleman with a history of morbid obesity, permanent atrial fibrillation and nonischemic cardiomyopathy with an ejection fraction of 35% who has previously refused a defibrillator on multiple occasions.  He returns in annual followup.      A year ago, the patient was seen in the hospital with what was felt to be angina and subsequently underwent a cardiac catheterization, which showed no evidence for coronary artery disease.  He was noted to have a markedly elevated LVEDP and was started on torsemide.  Interestingly, the patient's epigastric pain/chest pain resolved.      At today's visit, the patient states that he has been feeling well from a cardiovascular standpoint and has no cardiovascular complaints.  He denies any chest pain, chest pressure, shortness of breath, orthopnea or paroxysmal nocturnal dyspnea.      In the past, he has refused consideration for a sleep study despite likely having obstructive sleep apnea.      Please see my separate note with his full physical examination.     Outpatient Encounter Prescriptions as of 6/23/2017   Medication Sig Dispense Refill     metoprolol (LOPRESSOR) 50 MG tablet TAKE 3 TABLETS(150 MG) BY MOUTH TWICE DAILY 180 tablet 2     warfarin (COUMADIN) 5 MG tablet 5mg qd OR instructed by INR clinic 30 tablet 5     lisinopril (PRINIVIL/ZESTRIL) 10 MG tablet Take 1 tablet (10 mg) by mouth daily 90 tablet 3     torsemide (DEMADEX) 20 MG tablet Take 1 tablet (20 mg) by mouth daily 90 tablet 3     [DISCONTINUED] simvastatin (ZOCOR) 20 MG tablet Take 1 tablet (20 mg) by mouth At Bedtime 30 tablet 3     [DISCONTINUED] omeprazole (PRILOSEC) 20 MG CR capsule Take 1 capsule (20 mg) by mouth daily 90 capsule 3      WARFARIN SODIUM PO Take 2.5 mg by mouth Every Wednesday and 5 mg ROW       acetaminophen 650 MG TABS Take 650 mg by mouth every 4 hours as needed for mild pain 100 tablet 0     ASPIRIN NOT PRESCRIBED (INTENTIONAL) Please choose reason not prescribed, below 0 each 0     No facility-administered encounter medications on file as of 6/23/2017.       IMPRESSION AND PLAN:  Mr. Anthony is a pleasant, 56-year-old gentleman with stable nonischemic cardiomyopathy with an ejection fraction of 35%.  Again, a prior conversation has been had with the patient regarding a defibrillator, which he has refused.  At today's visit, he is clinically stable, and I will continue his lisinopril and metoprolol unchanged at the present time.  I will plan to see him back in routine clinical followup in 1 year with a repeat transthoracic echocardiogram to be completed at that time.     Again, thank you for allowing me to participate in the care of your patient.      Sincerely,    Wale Fischer MD     [facility]

## 2017-06-23 NOTE — PROGRESS NOTES
HPI and Plan:   See dictation    Orders Placed This Encounter   Procedures     Follow-Up with Cardiologist       No orders of the defined types were placed in this encounter.      There are no discontinued medications.      Encounter Diagnoses   Name Primary?     Cardiomyopathy, nonischemic (H) Yes     Chronic atrial fibrillation (H)        CURRENT MEDICATIONS:  Current Outpatient Prescriptions   Medication Sig Dispense Refill     metoprolol (LOPRESSOR) 50 MG tablet TAKE 3 TABLETS(150 MG) BY MOUTH TWICE DAILY 180 tablet 2     warfarin (COUMADIN) 5 MG tablet 5mg qd OR instructed by INR clinic 30 tablet 5     lisinopril (PRINIVIL/ZESTRIL) 10 MG tablet Take 1 tablet (10 mg) by mouth daily 90 tablet 3     simvastatin (ZOCOR) 20 MG tablet Take 1 tablet (20 mg) by mouth At Bedtime 30 tablet 3     torsemide (DEMADEX) 20 MG tablet Take 1 tablet (20 mg) by mouth daily 90 tablet 3     omeprazole (PRILOSEC) 20 MG CR capsule Take 1 capsule (20 mg) by mouth daily 90 capsule 3     WARFARIN SODIUM PO Take 2.5 mg by mouth Every Wednesday and 5 mg ROW       acetaminophen 650 MG TABS Take 650 mg by mouth every 4 hours as needed for mild pain 100 tablet 0     ASPIRIN NOT PRESCRIBED (INTENTIONAL) Please choose reason not prescribed, below 0 each 0       ALLERGIES   No Known Allergies    PAST MEDICAL HISTORY:  Past Medical History:   Diagnosis Date     Atherosclerotic heart disease     cardiac cath 2016: minimal disease     Atrial fibrillation (H)      Cardiomyopathy, nonischemic (H)      Chronic systolic congestive heart failure (H)      Diabetes mellitus      High cholesterol      Hypertension      Left ventricular diastolic dysfunction      Morbid obesity (H) 10/30/2015     Pulmonary edema 8/17/2015     Type 2 diabetes mellitus with diabetic chronic kidney disease (H) 8/17/2015       PAST SURGICAL HISTORY:  Past Surgical History:   Procedure Laterality Date     ANGIOGRAM  02/25/2016    Minimal coronary artery disease and no stenosis  "greater than 10% to at most 20%. Nonischemic cardiomyopathy, ejection fraction estimated to be 30%. Left ventricular end-diastolic pressure of 40 mmHg. No significant mitral regurgitation. Recommendations: Maximal medical management. Daily exercise and weight loss     COMBINED CYSTOSCOPY, RETROGRADES, URETEROSCOPY, INSERT STENT Left 5/13/2015    Procedure: COMBINED CYSTOSCOPY, RETROGRADES, URETEROSCOPY, INSERT STENT;  Surgeon: Jasen Medina MD;  Location: [facility] OR       FAMILY HISTORY:  Family History   Problem Relation Age of Onset     CANCER Sister        SOCIAL HISTORY:  Social History     Social History     Marital status: Single     Spouse name: N/A     Number of children: N/A     Years of education: N/A     Social History Main Topics     Smoking status: Former Smoker     Quit date: 1/1/1993     Smokeless tobacco: Never Used     Alcohol use No     Drug use: No     Sexual activity: No     Other Topics Concern     Caffeine Concern No     occas. coke and, Iced Tea     Special Diet No     watches sodium     Exercise No     walking some      Social History Narrative       Review of Systems:  Skin:  Negative       Eyes:  Positive for glasses    ENT:  Positive for nasal congestion    Respiratory:  Positive for dyspnea on exertion     Cardiovascular:    Positive for;edema    Gastroenterology: Positive for reflux    Genitourinary:  Positive for urinary frequency;nocturia    Musculoskeletal:  Negative      Neurologic:  Positive for numbness or tingling of feet    Psychiatric:  Positive for sleep disturbances    Heme/Lymph/Imm:  Negative      Endocrine:  Negative        Physical Exam:  Vitals: /84 (BP Location: Right arm, Patient Position: Chair, Cuff Size: Adult Large)  Pulse 80  Ht 1.759 m (5' 9.25\")  Wt (!) 163.7 kg (360 lb 14.4 oz)  BMI 52.91 kg/m2    Constitutional:  well developed;in no acute distress morbidly obese      Skin:  warm and dry to the touch        Head:  normocephalic        Eyes:  " sclera white        ENT:  no pallor or cyanosis        Neck:  no stiffness        Chest:  clear to auscultation          Cardiac:   irregularly irregular rhythm distant heart sounds   systolic murmur          Abdomen:  abdomen soft obese      Vascular: pulses full and equal                                        Extremities and Back:  no deformities, clubbing, cyanosis, erythema observed   bilateral LE edema;2+ RLE edema;1+ LLE edema;1+      Neurological:  no gross motor deficits;affect appropriate              CC  Wale Fischer MD   [facility]  [address]  [place] [address]

## 2017-06-24 NOTE — PROGRESS NOTES
HISTORY OF PRESENT ILLNESS:  Mr. Anthony is a pleasant, 56-year-old gentleman with a history of morbid obesity, permanent atrial fibrillation and nonischemic cardiomyopathy with an ejection fraction of 35% who has previously refused a defibrillator on multiple occasions.  He returns in annual followup.      A year ago, the patient was seen in the hospital with what was felt to be angina and subsequently underwent a cardiac catheterization, which showed no evidence for coronary artery disease.  He was noted to have a markedly elevated LVEDP and was started on torsemide.  Interestingly, the patient's epigastric pain/chest pain resolved.      At today's visit, the patient states that he has been feeling well from a cardiovascular standpoint and has no cardiovascular complaints.  He denies any chest pain, chest pressure, shortness of breath, orthopnea or paroxysmal nocturnal dyspnea.      In the past, he has refused consideration for a sleep study despite likely having obstructive sleep apnea.      Please see my separate note with his full physical examination.      IMPRESSION AND PLAN:  Mr. Anthony is a pleasant, 56-year-old gentleman with stable nonischemic cardiomyopathy with an ejection fraction of 35%.  Again, a prior conversation has been had with the patient regarding a defibrillator, which he has refused.  At today's visit, he is clinically stable, and I will continue his lisinopril and metoprolol unchanged at the present time.  I will plan to see him back in routine clinical followup in 1 year with a repeat transthoracic echocardiogram to be completed at that time.         MILA MEYERS MD             D: 2017 15:51   T: 2017 16:23   MT: tamara      Name:     CT ANTHONY   MRN:      [id number]        Account:      [id number]   :      1960           Service Date: 2017      Document: [id number]

## 2017-06-28 ENCOUNTER — TELEPHONE (OUTPATIENT)
Dept: FAMILY MEDICINE | Facility: CLINIC | Age: 57
End: 2017-06-28

## 2017-06-28 DIAGNOSIS — I70.90 ASVD (ARTERIOSCLEROTIC VASCULAR DISEASE): Primary | ICD-10-CM

## 2017-06-28 DIAGNOSIS — I48.20 CHRONIC ATRIAL FIBRILLATION (H): ICD-10-CM

## 2017-06-28 NOTE — TELEPHONE ENCOUNTER
Panel Management Review      Patient has the following on his problem list:     Diabetes    ASA: Failed    Last A1C  Lab Results   Component Value Date    A1C 5.4 03/31/2017    A1C 5.8 02/19/2016    A1C 6.7 08/18/2015    A1C 5.9 05/13/2015     A1C tested: Passed    Last LDL:    Lab Results   Component Value Date    CHOL 117 03/31/2017     Lab Results   Component Value Date    HDL 38 03/31/2017     Lab Results   Component Value Date    LDL 46 03/31/2017     Lab Results   Component Value Date    TRIG 164 03/31/2017     Lab Results   Component Value Date    CHOLHDLRATIO 2.3 12/18/2014     Lab Results   Component Value Date    NHDL 79 03/31/2017       Is the patient on a Statin? YES             Is the patient on Aspirin? NO    Medications     HMG CoA Reductase Inhibitors    simvastatin (ZOCOR) 20 MG tablet          Last three blood pressure readings:  BP Readings from Last 3 Encounters:   06/23/17 130/84   05/21/17 (!) 135/97   03/31/17 123/83       Date of last diabetes office visit: 2/9/16     Tobacco History:     History   Smoking Status     Former Smoker     Quit date: 1/1/1993   Smokeless Tobacco     Never Used           IVD   ASA: FAILED    Last LDL:    Lab Results   Component Value Date    CHOL 117 03/31/2017     Lab Results   Component Value Date    HDL 38 03/31/2017     Lab Results   Component Value Date    LDL 46 03/31/2017     Lab Results   Component Value Date    TRIG 164 03/31/2017        Lab Results   Component Value Date    CHOLHDLRATIO 2.3 12/18/2014        Is the patient on a Statin? YES   Is the patient on Aspirin? NO                  Medications     HMG CoA Reductase Inhibitors    simvastatin (ZOCOR) 20 MG tablet          Last three blood pressure readings:  BP Readings from Last 3 Encounters:   06/23/17 130/84   05/21/17 (!) 135/97   03/31/17 123/83        Tobacco History:     History   Smoking Status     Former Smoker     Quit date: 1/1/1993   Smokeless Tobacco     Never Used        Hypertension   Last three blood pressure readings:  BP Readings from Last 3 Encounters:   06/23/17 130/84   05/21/17 (!) 135/97   03/31/17 123/83     Blood pressure: Passed    HTN Guidelines:  Age 18-59 BP range:  Less than 140/90  Age 60-85 with Diabetes:  Less than 140/90  Age 60-85 without Diabetes:  less than 150/90      Composite cancer screening  Chart review shows that this patient is due/due soon for the following Colonoscopy  Summary:    Patient is due/failing the following:   ASA and COLONOSCOPY    Action needed:   Routed to provider for review.    Type of outreach:    None, routed to provider for review.    Questions for provider review:    Patient is not on aspirin                                                                                                                                    Babatunde Meyers [facility]        Chart routed to Provider .

## 2017-07-17 DIAGNOSIS — Z79.01 LONG TERM CURRENT USE OF ANTICOAGULANT THERAPY: ICD-10-CM

## 2017-07-17 RX ORDER — WARFARIN SODIUM 5 MG/1
5 TABLET ORAL DAILY
Qty: 30 TABLET | Refills: 1 | Status: SHIPPED | OUTPATIENT
Start: 2017-07-17 | End: 2017-09-14

## 2017-07-17 NOTE — TELEPHONE ENCOUNTER
warfarin (COUMADIN) 5 MG tablet    (Historical)  Last Written Prescription Date:  Unknown  Last Fill Quantity: Unknown,   # refills: Unknown  Last Office Visit with [facility], [facility] or [facility] prescribing provider: 3/31/2017      Date and Result of Last PT/INR:   Lab Results   Component Value Date    INR 2.8 06/23/2017    INR 3.2 05/25/2017    INR 2.25 05/21/2017    INR 1.58 02/26/2016        Routing refill request to provider for review/approval because:  Medication is reported/historical      TRACY Hurst  July 17, 2017  10:27 AM

## 2017-07-17 NOTE — TELEPHONE ENCOUNTER
Last INR: 2.8 on 06/23/17  Next INR: 08/04/17    Prescription approved per [facility] Refill Protocol.    Leticia Antunez RN

## 2017-07-22 DIAGNOSIS — I50.33 ACUTE ON CHRONIC DIASTOLIC CONGESTIVE HEART FAILURE (H): ICD-10-CM

## 2017-07-22 NOTE — TELEPHONE ENCOUNTER
Pending Prescriptions:                       Disp   Refills    torsemide (DEMADEX) 20 MG tablet [Pharmac*30 tab*0            Sig: TAKE 1 TABLET(20 MG) BY MOUTH DAILY              Last Written Prescription Date: 3/31/2017  Last Fill Quantity: 90, # refills: 3  Last Office Visit with [facility], [facility] or [facility] prescribing provider: 3/31/2017Ivon         Potassium   Date Value Ref Range Status   05/21/2017 4.0 3.4 - 5.3 mmol/L Final     Creatinine   Date Value Ref Range Status   05/21/2017 1.44 (H) 0.66 - 1.25 mg/dL Final     BP Readings from Last 3 Encounters:   06/23/17 130/84   05/21/17 (!) 135/97   03/31/17 123/83

## 2017-07-25 RX ORDER — TORSEMIDE 20 MG/1
TABLET ORAL
Qty: 90 TABLET | Refills: 1 | Status: SHIPPED | OUTPATIENT
Start: 2017-07-25 | End: 2018-04-10

## 2017-07-25 NOTE — TELEPHONE ENCOUNTER
Pt has existing refills, new pharmacy, approved  Prescription approved per [facility] Refill Protocol.  Mallika Sharif RN, BSN

## 2017-08-04 ENCOUNTER — ANTICOAGULATION THERAPY VISIT (OUTPATIENT)
Dept: NURSING | Facility: CLINIC | Age: 57
End: 2017-08-04
Payer: COMMERCIAL

## 2017-08-04 DIAGNOSIS — Z79.01 LONG-TERM (CURRENT) USE OF ANTICOAGULANTS: ICD-10-CM

## 2017-08-04 LAB — INR POINT OF CARE: 2.9 (ref 0.86–1.14)

## 2017-08-04 PROCEDURE — 85610 PROTHROMBIN TIME: CPT | Mod: QW

## 2017-08-04 PROCEDURE — 99207 ZZC NO CHARGE NURSE ONLY: CPT

## 2017-08-04 PROCEDURE — 36416 COLLJ CAPILLARY BLOOD SPEC: CPT

## 2017-08-04 NOTE — MR AVS SNAPSHOT
Nikunjnichole Anthony   8/4/2017 2:00 PM   Anticoagulation Therapy Visit    Description:  56 year old male   Provider:  [facility]   Department:  Cr Nurse           INR as of 8/4/2017     Today's INR 2.9      Anticoagulation Summary as of 8/4/2017     INR goal 2.0-3.0   Today's INR 2.9   Full instructions 5 mg every day   Next INR check 9/14/2017    Indications   Long-term (current) use of anticoagulants [Z79.01] [Z79.01]  Atrial fibrillation with rapid ventricular response (H) (Resolved) [I48.91]         Your next Anticoagulation Clinic appointment(s)     Aug 04, 2017  2:00 PM CDT   [facility] with [facility]   [facility]    [address] [address]   [phone]            Sep 14, 2017  1:45 PM CDT   Anticoagulation Visit with [facility]   [facility] ([facility]    [address] [address]   [phone]              Contact Numbers     Clinic Number:         August 2017 Details    Sun Mon Tue Wed Thu Fri Sat       1               2               3               4      5 mg   See details      5      5 mg           6      5 mg         7      5 mg         8      5 mg         9      5 mg         10      5 mg         11      5 mg         12      5 mg           13      5 mg         14      5 mg         15      5 mg         16      5 mg         17      5 mg         18      5 mg         19      5 mg           20      5 mg         21      5 mg         22      5 mg         23      5 mg         24      5 mg         25      5 mg         26      5 mg           27      5 mg         28      5 mg         29      5 mg         30      5 mg         31      5 mg            Date Details   08/04 This INR check               How to take your warfarin dose     To take:  5 mg Take 1 of the 5 mg tablets.           September 2017 Details     Sun Mon Tue Wed Thu Fri Sat          1      5 mg         2      5 mg           3      5 mg         4      5 mg         5      5 mg         6      5 mg         7      5 mg         8      5 mg         9      5 mg           10      5 mg         11      5 mg         12      5 mg         13      5 mg         14            15               16                 17               18               19               20               21               22               23                 24               25               26               27               28               29               30                Date Details   No additional details    Date of next INR:  9/14/2017         How to take your warfarin dose     To take:  5 mg Take 1 of the 5 mg tablets.

## 2017-08-04 NOTE — PROGRESS NOTES
ANTICOAGULATION FOLLOW-UP CLINIC VISIT    Patient Name:  Nikunj Anthony  Date:  8/4/2017  Contact Type:  Face to Face    SUBJECTIVE:     Patient Findings     Positives No Problem Findings           OBJECTIVE    INR Protime   Date Value Ref Range Status   08/04/2017 2.9 (A) 0.86 - 1.14 Final       ASSESSMENT / PLAN  INR assessment THER    Recheck INR In: 6 WEEKS    INR Location Clinic      Anticoagulation Summary as of 8/4/2017     INR goal 2.0-3.0   Today's INR 2.9   Maintenance plan 5 mg (5 mg x 1) every day   Full instructions 5 mg every day   Weekly total 35 mg   No change documented Leticia Antunez, KODY   Plan last modified Leticia Antunez RN (12/23/2016)   Next INR check 9/14/2017   Priority INR   Target end date Indefinite    Indications   Long-term (current) use of anticoagulants [Z79.01] [Z79.01]  Atrial fibrillation with rapid ventricular response (H) (Resolved) [I48.91]         Anticoagulation Episode Summary     INR check location     Preferred lab     Send INR reminders to [facility] CL[facility]    Comments       Anticoagulation Care Providers     Provider Role Specialty Phone number    Fred Del Valle MD [facility] [phone]            See the Encounter Report to view Anticoagulation Flowsheet and Dosing Calendar (Go to Encounters tab in chart review, and find the Anticoagulation Therapy Visit)        Leticia Antunez, RN

## 2017-08-14 DIAGNOSIS — I48.20 CHRONIC ATRIAL FIBRILLATION (H): ICD-10-CM

## 2017-08-14 DIAGNOSIS — K29.70 GASTRITIS: ICD-10-CM

## 2017-08-15 DIAGNOSIS — K29.70 GASTRITIS: ICD-10-CM

## 2017-08-15 NOTE — TELEPHONE ENCOUNTER
simvastatin (ZOCOR) 20 MG tablet     Last Written Prescription Date: 3/31/17  Last Fill Quantity: 30, # refills: 3  Last Office Visit with [facility], [facility] or [facility] prescribing provider: 3/31/2017       Lab Results   Component Value Date    CHOL 117 03/31/2017     Lab Results   Component Value Date    HDL 38 03/31/2017     Lab Results   Component Value Date    LDL 46 03/31/2017     Lab Results   Component Value Date    TRIG 164 03/31/2017     Lab Results   Component Value Date    CHOLHDLRATIO 2.3 12/18/2014     ________________________________________________________________________________    3 month Supply with 3 RF sent 3/31/17.    omeprazole (PRILOSEC) 20 MG CR capsule  Last Written Prescription Date: 3/31/17  Last Fill Quantity: 90,  # refills: 3   Last Office Visit with [facility], [facility] or [facility] prescribing provider:  3/31/2017                                              Fax sent to pharm informing above.  Please disregard Omeprazole request.    TRACY Hurst  August 15, 2017  6:02 PM

## 2017-08-16 RX ORDER — SIMVASTATIN 20 MG
TABLET ORAL
Qty: 30 TABLET | Refills: 0 | Status: SHIPPED | OUTPATIENT
Start: 2017-08-16 | End: 2017-09-14

## 2017-08-16 NOTE — TELEPHONE ENCOUNTER
Does patient need follow up labs?  Per result note pt was to have a health  contact him but doesn't appear this was done    Juan Francisco Fernandez RN, BSN

## 2017-08-16 NOTE — TELEPHONE ENCOUNTER
Pending Prescriptions:                       Disp   Refills    omeprazole (PRILOSEC) 20 MG CR capsule [P*30 cap*0            Sig: TAKE 1 CAPSULE BY MOUTH EVERY DAY              Last Written Prescription Date: 3/31/2017  Last Fill Quantity: 90,  # refills: 3   Last Office Visit with [facility], [facility] or [facility] prescribing provider: 3/31/2017Ivon

## 2017-08-18 ENCOUNTER — TELEPHONE (OUTPATIENT)
Dept: FAMILY MEDICINE | Facility: CLINIC | Age: 57
End: 2017-08-18

## 2017-08-18 DIAGNOSIS — K21.00 GASTROESOPHAGEAL REFLUX DISEASE WITH ESOPHAGITIS: Primary | ICD-10-CM

## 2017-08-18 NOTE — TELEPHONE ENCOUNTER
Fax from [facility], omeprazole NOT covered, need PA or RX change, routed to Fred Del Valle MD, please advise, do you want to see pt?, route for processing    NO RECENT APPOINTMENT IN PAST 12 MONTHS FOR GASTRITIS    Last OV: 3/31/17  Reason for visit: routine and multiple other problems    Pharmacy Benefit Information:    Provider: KEVIN  Phone #: [phone]  ID#: [id number]  Mallika Sharif RN, BSN  Message handled by Nurse Triage.

## 2017-08-21 PROBLEM — K21.00 GASTROESOPHAGEAL REFLUX DISEASE WITH ESOPHAGITIS: Status: ACTIVE | Noted: 2017-08-21

## 2017-08-21 NOTE — TELEPHONE ENCOUNTER
PA filed via [facility], Fred Del Valle MD please UPDATE problem list, gastritis is current ICD 9 code, add this or GERD?    Mallika Sharif RN, BSN  Message handled by Nurse Triage.

## 2017-08-29 NOTE — TELEPHONE ENCOUNTER
PA approved.  Effective date: 8/22/17-8/22/18  PA reference #: [id number]  Pt. notified:   Yes pt informing PA approved and patient to notify pharmacy.  Mallika Sharif RN, BSN  Message handled by Nurse Triage.

## 2017-09-06 ENCOUNTER — ANTICOAGULATION THERAPY VISIT (OUTPATIENT)
Dept: NURSING | Facility: CLINIC | Age: 57
End: 2017-09-06
Payer: COMMERCIAL

## 2017-09-06 LAB — INR POINT OF CARE: 2.7 (ref 0.86–1.14)

## 2017-09-06 PROCEDURE — 99207 ZZC NO CHARGE NURSE ONLY: CPT

## 2017-09-06 PROCEDURE — 85610 PROTHROMBIN TIME: CPT | Mod: QW

## 2017-09-06 PROCEDURE — 36416 COLLJ CAPILLARY BLOOD SPEC: CPT

## 2017-09-06 NOTE — PROGRESS NOTES
ANTICOAGULATION FOLLOW-UP CLINIC VISIT    Patient Name:  Nikunj Anthony  Date:  9/6/2017   Contact Type:  Face to Face    SUBJECTIVE:     Patient Findings     Positives Other complaints (passed kidney stone on 9/4/17), OTC meds (Tylenol 9/3 and 9/4 - kidney stone)           OBJECTIVE    INR Protime   Date Value Ref Range Status   09/06/2017 2.7 (A) 0.86 - 1.14 Final       ASSESSMENT / PLAN  INR assessment THER    Recheck INR In: 5 WEEKS    INR Location Clinic      Anticoagulation Summary as of 9/6/2017     INR goal 2.0-3.0   Today's INR 2.7   Maintenance plan 5 mg (5 mg x 1) every day   Full instructions 5 mg every day   Weekly total 35 mg   No change documented Moriah Mcconnell RN   Plan last modified Leticia Antunez RN (12/23/2016)   Next INR check 10/10/2017   Priority INR   Target end date Indefinite    Indications   Long-term (current) use of anticoagulants [Z79.01] [Z79.01]  Atrial fibrillation with rapid ventricular response (H) (Resolved) [I48.91]         Anticoagulation Episode Summary     INR check location     Preferred lab     Send INR reminders to [facility] CLINIC    Comments       Anticoagulation Care Providers     Provider Role Specialty Phone number    Fred Del Valle MD [facility] [phone]            See the Encounter Report to view Anticoagulation Flowsheet and Dosing Calendar (Go to Encounters tab in chart review, and find the Anticoagulation Therapy Visit)        Moriah Mcconnell, RN

## 2017-09-06 NOTE — MR AVS SNAPSHOT
Nikunj Gabrielle   9/6/2017 11:30 AM   Anticoagulation Therapy Visit    Description:  56 year old male   Provider:  [facility]   Department:   Nurse           INR as of 9/6/2017     Today's INR 2.7      Anticoagulation Summary as of 9/6/2017     INR goal 2.0-3.0   Today's INR 2.7   Full instructions 5 mg every day   Next INR check 10/10/2017    Indications   Long-term (current) use of anticoagulants [Z79.01] [Z79.01]  Atrial fibrillation with rapid ventricular response (H) (Resolved) [I48.91]         Your next Anticoagulation Clinic appointment(s)     Oct 10, 2017  1:00 PM CDT   Anticoagulation Visit with  ANTICOAGULATION CLINIC   [facility]    [address] [place] [address] [phone]              Contact Numbers     [facility]  Please call to cancel and/or reschedule your appointment, or with any problems or questions regarding your therapy.  Anticoagulation Nurse: [phone]  Main Clinic: [phone]             September 2017 Details    Sun Mon Tue Wed Thu Fri Sat          1               2                 3               4               5               6      5 mg   See details      7      5 mg         8      5 mg         9      5 mg           10      5 mg         11      5 mg         12      5 mg         13      5 mg         14      5 mg         15      5 mg         16      5 mg           17      5 mg         18      5 mg         19      5 mg         20      5 mg         21      5 mg         22      5 mg         23      5 mg           24      5 mg         25      5 mg         26      5 mg         27      5 mg         28      5 mg         29      5 mg         30      5 mg          Date Details   09/06 This INR check               How to take your warfarin dose     To take:  5 mg Take 1 of the 5 mg tablets.           October 2017 Details    Sun Mon Tue Wed Thu Fri Sat     1      5 mg         2      5 mg          3      5 mg         4      5 mg         5      5 mg         6      5 mg         7      5 mg           8      5 mg         9      5 mg         10            11               12               13               14                 15               16               17               18               19               20               21                 22               23               24               25               26               27               28                 29               30               31                    Date Details   No additional details    Date of next INR:  10/10/2017         How to take your warfarin dose     To take:  5 mg Take 1 of the 5 mg tablets.

## 2017-09-14 DIAGNOSIS — I48.20 CHRONIC ATRIAL FIBRILLATION (H): ICD-10-CM

## 2017-09-14 DIAGNOSIS — Z79.01 LONG TERM CURRENT USE OF ANTICOAGULANT THERAPY: ICD-10-CM

## 2017-09-15 RX ORDER — SIMVASTATIN 20 MG
TABLET ORAL
Qty: 90 TABLET | Refills: 1 | Status: SHIPPED | OUTPATIENT
Start: 2017-09-15 | End: 2018-04-10

## 2017-09-15 RX ORDER — WARFARIN SODIUM 5 MG/1
TABLET ORAL
Qty: 30 TABLET | Refills: 3 | Status: SHIPPED | OUTPATIENT
Start: 2017-09-15 | End: 2017-12-01

## 2017-09-15 NOTE — TELEPHONE ENCOUNTER
Warfarin    Last Written Prescription Date: 05/03/2017  Last Fill Qty: 30,08/14/2017 # refills: 5  Last Office Visit with [facility], [facility] or [facility] prescribing provider: 03/31/2017-Dr Del Valle       Date and Result of Last PT/INR:   Lab Results   Component Value Date    INR 2.7 09/06/2017    INR 2.9 08/04/2017    INR 2.25 05/21/2017    INR 1.58 02/26/2016          Simvastatin       Last Written Prescription Date: 3/31/2017  Last Fill Quantity: 90,08/16/2017 # refills: 3    Last Office Visit with [facility], [facility] or [facility] prescribing provider:  03/31/2017-Dr Del Valle   Future Office Visit:      Cholesterol   Date Value Ref Range Status   03/31/2017 117 <200 mg/dL Final     HDL Cholesterol   Date Value Ref Range Status   03/31/2017 38 (L) >39 mg/dL Final     LDL Cholesterol Calculated   Date Value Ref Range Status   03/31/2017 46 <100 mg/dL Final     Comment:     Desirable:       <100 mg/dl     Triglycerides   Date Value Ref Range Status   03/31/2017 164 (H) <150 mg/dL Final     Comment:     Borderline high:  150-199 mg/dl   High:             200-499 mg/dl   Very high:       >499 mg/dl   Non Fasting       Cholesterol/HDL Ratio   Date Value Ref Range Status   12/18/2014 2.3 0.0 - 5.0 Final     ALT   Date Value Ref Range Status   03/31/2017 29 0 - 70 U/L Final

## 2017-09-15 NOTE — TELEPHONE ENCOUNTER
Prescription approved per [facility] Refill Protocol.  Juan Francisco Fernandez RN, BSN

## 2017-10-04 DIAGNOSIS — I50.33 ACUTE ON CHRONIC DIASTOLIC CONGESTIVE HEART FAILURE (H): ICD-10-CM

## 2017-10-04 DIAGNOSIS — I10 ESSENTIAL HYPERTENSION, BENIGN: ICD-10-CM

## 2017-10-04 NOTE — TELEPHONE ENCOUNTER
metoprolol (LOPRESSOR) 50 MG tablet      Last Written Prescription Date: 6/15/17  Last Fill Quantity: 180, # refills: 2    Last Office Visit with [facility], [facility] or [facility] prescribing provider:  Ivon 3/31/17   Future Office Visit: 10/10/17 JAYDEN       BP Readings from Last 3 Encounters:   06/23/17 130/84   05/21/17 (!) 135/97   03/31/17 123/83

## 2017-10-05 RX ORDER — METOPROLOL TARTRATE 50 MG
TABLET ORAL
Qty: 180 TABLET | Refills: 0 | Status: SHIPPED | OUTPATIENT
Start: 2017-10-05 | End: 2017-11-09

## 2017-10-05 NOTE — TELEPHONE ENCOUNTER
Prescription approved per [facility] Refill Protocol.    Danielle GARCES RN, BSN, PHN  [place] [facility] RN

## 2017-10-17 ENCOUNTER — ANTICOAGULATION THERAPY VISIT (OUTPATIENT)
Dept: NURSING | Facility: CLINIC | Age: 57
End: 2017-10-17
Payer: COMMERCIAL

## 2017-10-17 DIAGNOSIS — Z79.01 LONG-TERM (CURRENT) USE OF ANTICOAGULANTS: ICD-10-CM

## 2017-10-17 LAB — INR POINT OF CARE: 2.8 (ref 0.86–1.14)

## 2017-10-17 PROCEDURE — 36416 COLLJ CAPILLARY BLOOD SPEC: CPT

## 2017-10-17 PROCEDURE — 85610 PROTHROMBIN TIME: CPT | Mod: QW

## 2017-10-17 PROCEDURE — 99207 ZZC NO CHARGE NURSE ONLY: CPT

## 2017-10-17 NOTE — PROGRESS NOTES
ANTICOAGULATION FOLLOW-UP CLINIC VISIT    Patient Name:  Nikunj Anthony  Date:  10/17/2017  Contact Type:  Face to Face    SUBJECTIVE:     Patient Findings     Positives Change in medications (Took Hydrocodone and Finasteride last week, pt has discontinued both now.), Change in diet/appetite (Pt had decreased appetite last week due to kidney stone), Missed doses (Pt intentionally missed 5mg since he was not eating or drinking, he was afraid his INR would go too high.)    Comments Was seen last week at ER in [place], [place], diagnosed with kidney stone.  Patient states he did pass the stone and is feeling better now.           OBJECTIVE    INR Protime   Date Value Ref Range Status   10/17/2017 2.8 (A) 0.86 - 1.14 Final       ASSESSMENT / PLAN  INR assessment THER    Recheck INR In: 6 WEEKS    INR Location Clinic      Anticoagulation Summary as of 10/17/2017     INR goal 2.0-3.0   Today's INR 2.8   Maintenance plan 5 mg (5 mg x 1) every day   Full instructions 5 mg every day   Weekly total 35 mg   No change documented Leticia Antunez RN   Plan last modified Leticia Antunez RN (12/23/2016)   Next INR check 11/28/2017   Priority INR   Target end date Indefinite    Indications   Long-term (current) use of anticoagulants [Z79.01] [Z79.01]  Atrial fibrillation with rapid ventricular response (H) (Resolved) [I48.91]         Anticoagulation Episode Summary     INR check location     Preferred lab     Send INR reminders to  ANTICOAG CLINIC    Comments       Anticoagulation Care Providers     Provider Role Specialty Phone number    Fred Del Valle MD [facility] [phone]            See the Encounter Report to view Anticoagulation Flowsheet and Dosing Calendar (Go to Encounters tab in chart review, and find the Anticoagulation Therapy Visit)        Leticia Antunez, RN

## 2017-10-17 NOTE — MR AVS SNAPSHOT
Nikunj Gabrielle   10/17/2017 1:15 PM   Anticoagulation Therapy Visit    Description:  56 year old male   Provider:  [facility]   Department:  Cr Nurse           INR as of 10/17/2017     Today's INR 2.8      Anticoagulation Summary as of 10/17/2017     INR goal 2.0-3.0   Today's INR 2.8   Full instructions 5 mg every day   Next INR check 11/28/2017    Indications   Long-term (current) use of anticoagulants [Z79.01] [Z79.01]  Atrial fibrillation with rapid ventricular response (H) (Resolved) [I48.91]         Contact Numbers     Clinic Number:         October 2017 Details    Sun Mon Tue Wed Thu Fri Sat     1               2               3               4               5               6               7                 8               9               10               11               12               13               14                 15               16               17      5 mg   See details      18      5 mg         19      5 mg         20      5 mg         21      5 mg           22      5 mg         23      5 mg         24      5 mg         25      5 mg         26      5 mg         27      5 mg         28      5 mg           29      5 mg         30      5 mg         31      5 mg              Date Details   10/17 This INR check               How to take your warfarin dose     To take:  5 mg Take 1 of the 5 mg tablets.           November 2017 Details    Sun Mon Tue Wed Thu Fri Sat        1      5 mg         2      5 mg         3      5 mg         4      5 mg           5      5 mg         6      5 mg         7      5 mg         8      5 mg         9      5 mg         10      5 mg         11      5 mg           12      5 mg         13      5 mg         14      5 mg         15      5 mg         16      5 mg         17      5 mg         18      5 mg           19      5 mg         20      5 mg         21      5 mg         22      5 mg         23      5 mg         24      5 mg         25      5 mg            26      5 mg         27      5 mg         28            29               30                  Date Details   No additional details    Date of next INR:  11/28/2017         How to take your warfarin dose     To take:  5 mg Take 1 of the 5 mg tablets.

## 2017-11-09 DIAGNOSIS — I50.33 ACUTE ON CHRONIC DIASTOLIC CONGESTIVE HEART FAILURE (H): ICD-10-CM

## 2017-11-09 DIAGNOSIS — I10 ESSENTIAL HYPERTENSION, BENIGN: ICD-10-CM

## 2017-11-10 RX ORDER — METOPROLOL TARTRATE 50 MG
TABLET ORAL
Qty: 180 TABLET | Refills: 0 | OUTPATIENT
Start: 2017-11-10

## 2017-11-10 RX ORDER — METOPROLOL TARTRATE 50 MG
TABLET ORAL
Qty: 180 TABLET | Refills: 0 | Status: SHIPPED | OUTPATIENT
Start: 2017-11-10 | End: 2017-12-06

## 2017-11-10 NOTE — TELEPHONE ENCOUNTER
Routing refill request to provider for review/approval because:  Jayne given x1 and patient did not follow up, please advise    Due for HCM and this was filled last month-  LM for call back do you want to fill again?    Magaly Cueto, RN

## 2017-12-01 ENCOUNTER — ANTICOAGULATION THERAPY VISIT (OUTPATIENT)
Dept: NURSING | Facility: CLINIC | Age: 57
End: 2017-12-01
Payer: COMMERCIAL

## 2017-12-01 ENCOUNTER — TELEPHONE (OUTPATIENT)
Dept: NURSING | Facility: CLINIC | Age: 57
End: 2017-12-01

## 2017-12-01 DIAGNOSIS — Z79.01 LONG-TERM (CURRENT) USE OF ANTICOAGULANTS: ICD-10-CM

## 2017-12-01 DIAGNOSIS — Z79.01 LONG TERM CURRENT USE OF ANTICOAGULANT THERAPY: ICD-10-CM

## 2017-12-01 DIAGNOSIS — I48.20 CHRONIC ATRIAL FIBRILLATION (H): Primary | ICD-10-CM

## 2017-12-01 LAB — INR POINT OF CARE: 2.5 (ref 0.86–1.14)

## 2017-12-01 PROCEDURE — 85610 PROTHROMBIN TIME: CPT | Mod: QW

## 2017-12-01 PROCEDURE — 36416 COLLJ CAPILLARY BLOOD SPEC: CPT

## 2017-12-01 PROCEDURE — 99207 ZZC NO CHARGE NURSE ONLY: CPT

## 2017-12-01 RX ORDER — WARFARIN SODIUM 5 MG/1
5 TABLET ORAL DAILY
Qty: 30 TABLET | Refills: 3 | Status: SHIPPED | OUTPATIENT
Start: 2017-12-01 | End: 2018-04-12

## 2017-12-01 NOTE — MR AVS SNAPSHOT
Nikunj Baberg   12/1/2017 1:15 PM   Anticoagulation Therapy Visit    Description:  56 year old male   Provider:  [facility]   Department:  Cr [facility]           INR as of 12/1/2017     Today's INR 2.5      Anticoagulation Summary as of 12/1/2017     INR goal 2.0-3.0   Today's INR 2.5   Full instructions 5 mg every day   Next INR check 1/12/2018    Indications   Long-term (current) use of anticoagulants [Z79.01] [Z79.01]  Atrial fibrillation with rapid ventricular response (H) (Resolved) [I48.91]         Contact Numbers     Clinic Number:         December 2017 Details    Sun Mon Tue Wed Thu Fri Sat          1      5 mg   See details      2      5 mg           3      5 mg         4      5 mg         5      5 mg         6      5 mg         7      5 mg         8      5 mg         9      5 mg           10      5 mg         11      5 mg         12      5 mg         13      5 mg         14      5 mg         15      5 mg         16      5 mg           17      5 mg         18      5 mg         19      5 mg         20      5 mg         21      5 mg         22      5 mg         23      5 mg           24      5 mg         25      5 mg         26      5 mg         27      5 mg         28      5 mg         29      5 mg         30      5 mg           31      5 mg                Date Details   12/01 This INR check               How to take your warfarin dose     To take:  5 mg Take 1 of the 5 mg tablets.           January 2018 Details    Sun Mon Tue Wed Thu Fri Sat      1      5 mg         2      5 mg         3      5 mg         4      5 mg         5      5 mg         6      5 mg           7      5 mg         8      5 mg         9      5 mg         10      5 mg         11      5 mg         12            13                 14               15               16               17               18               19               20                 21               22               23               24               25                26               27                 28               29               30               31                   Date Details   No additional details    Date of next INR:  1/12/2018         How to take your warfarin dose     To take:  5 mg Take 1 of the 5 mg tablets.

## 2017-12-01 NOTE — TELEPHONE ENCOUNTER
New insurance and medicare reimbursement rules require that all of our INR patients have an INR Clinic referral order in Epic, and that it be renewed annually.    We have entered this initial order for you and your signature is required once you review it.      You may close this encounter once signed.    Thank you,    Leticia Antunez RN

## 2017-12-06 DIAGNOSIS — I50.33 ACUTE ON CHRONIC DIASTOLIC CONGESTIVE HEART FAILURE (H): ICD-10-CM

## 2017-12-06 DIAGNOSIS — I10 ESSENTIAL HYPERTENSION, BENIGN: ICD-10-CM

## 2017-12-07 DIAGNOSIS — I50.33 ACUTE ON CHRONIC DIASTOLIC CONGESTIVE HEART FAILURE (H): ICD-10-CM

## 2017-12-07 DIAGNOSIS — I10 ESSENTIAL HYPERTENSION, BENIGN: ICD-10-CM

## 2017-12-07 RX ORDER — METOPROLOL TARTRATE 50 MG
TABLET ORAL
Status: SHIPPED
Start: 2017-12-07 | End: 2018-02-06

## 2017-12-07 RX ORDER — METOPROLOL TARTRATE 50 MG
TABLET ORAL
Qty: 180 TABLET | Refills: 1 | Status: SHIPPED | OUTPATIENT
Start: 2017-12-07 | End: 2018-02-06

## 2018-01-08 DIAGNOSIS — I50.33 ACUTE ON CHRONIC DIASTOLIC CONGESTIVE HEART FAILURE (H): ICD-10-CM

## 2018-01-08 DIAGNOSIS — I10 ESSENTIAL HYPERTENSION, BENIGN: ICD-10-CM

## 2018-01-08 NOTE — TELEPHONE ENCOUNTER
"Requested Prescriptions   Pending Prescriptions Disp Refills     metoprolol (LOPRESSOR) 50 MG tablet [Pharmacy Med Name: METOPROLOL TARTRATE 50MG TABLETS]  Medication may not be due for refill  Last Written Prescription Date:  12/7/2017  Last Fill Quantity: 180 tablet,  # refills: 1   Last Office Visit with [facility], [facility] or [facility] prescribing provider:  3/31/2017      180 tablet 0     Sig: TAKE THREE TABLETS TWICE DAILY    Beta-Blockers Protocol Passed    1/8/2018  1:26 PM       Passed - Blood pressure under 140/90    BP Readings from Last 3 Encounters:   06/23/17 130/84   05/21/17 (!) 135/97   03/31/17 123/83          Passed - Patient is age 6 or older       Passed - Recent or future visit with authorizing provider's specialty    Patient had office visit in the last year or has a visit in the next 30 days with authorizing provider.  See \"Patient Info\" tab in inbasket, or \"Choose Columns\" in Meds & Orders section of the refill encounter.             "

## 2018-01-09 RX ORDER — METOPROLOL TARTRATE 50 MG
TABLET ORAL
Qty: 180 TABLET | Refills: 0 | Status: SHIPPED | OUTPATIENT
Start: 2018-01-09 | End: 2018-02-04

## 2018-01-09 NOTE — TELEPHONE ENCOUNTER
Prescription approved per [facility] Refill Protocol.  For one time fill due for f/u   Magaly Cueto RN

## 2018-01-19 ENCOUNTER — TELEPHONE (OUTPATIENT)
Dept: FAMILY MEDICINE | Facility: CLINIC | Age: 58
End: 2018-01-19

## 2018-01-19 NOTE — TELEPHONE ENCOUNTER
Panel Management Review      Patient has the following on his problem list:     Diabetes    ASA: Failed    Last A1C  Lab Results   Component Value Date    A1C 5.4 03/31/2017    A1C 5.8 02/19/2016    A1C 6.7 08/18/2015    A1C 5.9 05/13/2015     A1C tested: Passed    Last LDL:    Lab Results   Component Value Date    CHOL 117 03/31/2017     Lab Results   Component Value Date    HDL 38 03/31/2017     Lab Results   Component Value Date    LDL 46 03/31/2017     Lab Results   Component Value Date    TRIG 164 03/31/2017     Lab Results   Component Value Date    CHOLHDLRATIO 2.3 12/18/2014     Lab Results   Component Value Date    NHDL 79 03/31/2017       Is the patient on a Statin? YES             Is the patient on Aspirin? NO    Medications     HMG CoA Reductase Inhibitors    simvastatin (ZOCOR) 20 MG tablet          Last three blood pressure readings:  BP Readings from Last 3 Encounters:   06/23/17 130/84   05/21/17 (!) 135/97   03/31/17 123/83       Date of last diabetes office visit: 2/9/16     Tobacco History:     History   Smoking Status     Former Smoker     Quit date: 1/1/1993   Smokeless Tobacco     Never Used         Hypertension   Last three blood pressure readings:  BP Readings from Last 3 Encounters:   06/23/17 130/84   05/21/17 (!) 135/97   03/31/17 123/83     Blood pressure: Passed    HTN Guidelines:  Age 18-59 BP range:  Less than 140/90  Age 60-85 with Diabetes:  Less than 140/90  Age 60-85 without Diabetes:  less than 150/90          Composite cancer screening  Chart review shows that this patient is due/due soon for the following Colonoscopy  Summary:    Patient is due/failing the following:   COLONOSCOPY    Action needed:   Patient needs referral/order: colonoscopy      Type of outreach:    Phone, spoke to patient.  patient declined     Questions for provider review:    None                                                                                                                                     Babatunde Meyers CMA        Chart routed to none .

## 2018-02-04 DIAGNOSIS — I10 ESSENTIAL HYPERTENSION, BENIGN: ICD-10-CM

## 2018-02-04 DIAGNOSIS — I50.33 ACUTE ON CHRONIC DIASTOLIC CONGESTIVE HEART FAILURE (H): ICD-10-CM

## 2018-02-04 NOTE — TELEPHONE ENCOUNTER
"Requested Prescriptions   Pending Prescriptions Disp Refills     metoprolol tartrate (LOPRESSOR) 50 MG tablet [Pharmacy Med Name: METOPROLOL TARTRATE 50MG TABLETS]  Last Written Prescription Date:  1/9/2018  Last Fill Quantity: 180 tablet,  # refills: 0   Last Office Visit with [facility] provider:  3/31/2017    180 tablet 0     Sig: TAKE 3 TABLET BY MOUTH TWICE DAILY    Beta-Blockers Protocol Passed    2/4/2018 11:57 AM       Passed - Blood pressure under 140/90    BP Readings from Last 3 Encounters:   06/23/17 130/84   05/21/17 (!) 135/97   03/31/17 123/83          Passed - Patient is age 6 or older       Passed - Recent or future visit with authorizing provider's specialty    Patient had office visit in the last year or has a visit in the next 30 days with authorizing provider.  See \"Patient Info\" tab in inbasket, or \"Choose Columns\" in Meds & Orders section of the refill encounter.               "

## 2018-02-06 DIAGNOSIS — I10 BENIGN ESSENTIAL HYPERTENSION: ICD-10-CM

## 2018-02-06 RX ORDER — METOPROLOL TARTRATE 50 MG
TABLET ORAL
Qty: 180 TABLET | Refills: 0 | Status: SHIPPED | OUTPATIENT
Start: 2018-02-06 | End: 2018-03-07

## 2018-02-06 NOTE — TELEPHONE ENCOUNTER
Routing refill request to provider for review/approval because:  Patient needs to be seen because:  Coming due for follow up with PCP.     Yeimi Wong RN -- [facility]

## 2018-02-07 RX ORDER — LISINOPRIL 10 MG/1
TABLET ORAL
Qty: 90 TABLET | Refills: 0 | Status: SHIPPED | OUTPATIENT
Start: 2018-02-07 | End: 2018-02-08

## 2018-02-07 NOTE — TELEPHONE ENCOUNTER
Medication is being filled for 1 time refill only due to:  Patient needs to be seen because due for annual physical in March.     Prescription approved per [facility] Refill Protocol.  Dx: type 2 DM with CKD    Danielle GARCES RN, BSN, PHN  [place] [facility] RN

## 2018-02-07 NOTE — TELEPHONE ENCOUNTER
"Last Written Prescription Date:  3/31/2017  Last Fill Quantity: 90,  # refills: 3   Last Office Visit with [facility] provider:  3/31/2017   Future Office Visit:       Requested Prescriptions   Pending Prescriptions Disp Refills     lisinopril (PRINIVIL/ZESTRIL) 10 MG tablet [Pharmacy Med Name: LISINOPRIL 10MG TABLETS] 90 tablet 0     Sig: TAKE 1 TABLET BY MOUTH EVERY DAY    ACE Inhibitors (Including Combos) Protocol Failed    2/6/2018 10:41 AM       Failed - Normal serum creatinine on file in past 12 months    Recent Labs   Lab Test  05/21/17   0955   CR  1.44*            Passed - Blood pressure under 140/90    BP Readings from Last 3 Encounters:   06/23/17 130/84   05/21/17 (!) 135/97   03/31/17 123/83                Passed - Recent or future visit with authorizing provider's specialty    Patient had office visit in the last year or has a visit in the next 30 days with authorizing provider.  See \"Patient Info\" tab in inbasket, or \"Choose Columns\" in Meds & Orders section of the refill encounter.            Passed - Patient is age 18 or older       Passed - Normal serum potassium on file in past 12 months    Recent Labs   Lab Test  05/21/17   0955   POTASSIUM  4.0             "

## 2018-02-08 RX ORDER — LISINOPRIL 10 MG/1
10 TABLET ORAL DAILY
Qty: 90 TABLET | Refills: 0 | Status: SHIPPED | OUTPATIENT
Start: 2018-02-08 | End: 2018-04-10

## 2018-02-08 NOTE — TELEPHONE ENCOUNTER
Fax asking lisinopril transfer to [facility]  Prescription approved per [facility] Refill Protocol.  Mallika Sharif RN, BSN

## 2018-02-23 ENCOUNTER — ANTICOAGULATION THERAPY VISIT (OUTPATIENT)
Dept: NURSING | Facility: CLINIC | Age: 58
End: 2018-02-23
Payer: COMMERCIAL

## 2018-02-23 DIAGNOSIS — Z79.01 LONG-TERM (CURRENT) USE OF ANTICOAGULANTS: ICD-10-CM

## 2018-02-23 LAB — INR POINT OF CARE: 2.9 (ref 0.86–1.14)

## 2018-02-23 PROCEDURE — 99207 ZZC NO CHARGE NURSE ONLY: CPT

## 2018-02-23 PROCEDURE — 36416 COLLJ CAPILLARY BLOOD SPEC: CPT

## 2018-02-23 PROCEDURE — 85610 PROTHROMBIN TIME: CPT | Mod: QW

## 2018-02-23 NOTE — MR AVS SNAPSHOT
Nikunj Gabrielle   2/23/2018 1:00 PM   Anticoagulation Therapy Visit    Description:  57 year old male   Provider:  [facility]   Department:  Cr Nurse           INR as of 2/23/2018     Today's INR 2.9      Anticoagulation Summary as of 2/23/2018     INR goal 2.0-3.0   Today's INR 2.9   Full instructions 5 mg every day   Next INR check 4/6/2018    Indications   Long-term (current) use of anticoagulants [Z79.01] [Z79.01]  Atrial fibrillation with rapid ventricular response (H) (Resolved) [I48.91]         Contact Numbers     Clinic Number:         February 2018 Details    Sun Mon Tue Wed Thu Fri Sat         1               2               3                 4               5               6               7               8               9               10                 11               12               13               14               15               16               17                 18               19               20               21               22               23      5 mg   See details      24      5 mg           25      5 mg         26      5 mg         27      5 mg         28      5 mg             Date Details   02/23 This INR check               How to take your warfarin dose     To take:  5 mg Take 1 of the 5 mg tablets.           March 2018 Details    Sun Mon Tue Wed Thu Fri Sat         1      5 mg         2      5 mg         3      5 mg           4      5 mg         5      5 mg         6      5 mg         7      5 mg         8      5 mg         9      5 mg         10      5 mg           11      5 mg         12      5 mg         13      5 mg         14      5 mg         15      5 mg         16      5 mg         17      5 mg           18      5 mg         19      5 mg         20      5 mg         21      5 mg         22      5 mg         23      5 mg         24      5 mg           25      5 mg         26      5 mg         27      5 mg         28      5 mg         29      5 mg          30      5 mg         31      5 mg          Date Details   No additional details            How to take your warfarin dose     To take:  5 mg Take 1 of the 5 mg tablets.           April 2018 Details    Sun Mon Tue Wed Thu Fri Sat     1      5 mg         2      5 mg         3      5 mg         4      5 mg         5      5 mg         6            7                 8               9               10               11               12               13               14                 15               16               17               18               19               20               21                 22               23               24               25               26               27               28                 29               30                     Date Details   No additional details    Date of next INR:  4/6/2018         How to take your warfarin dose     To take:  5 mg Take 1 of the 5 mg tablets.

## 2018-02-23 NOTE — PROGRESS NOTES
ANTICOAGULATION FOLLOW-UP CLINIC VISIT    Patient Name:  Nikunj Anthony  Date:  2/23/2018  Contact Type:  Face to Face    SUBJECTIVE:        OBJECTIVE    INR Protime   Date Value Ref Range Status   02/23/2018 2.9 (A) 0.86 - 1.14 Final       ASSESSMENT / PLAN  INR assessment THER    Recheck INR In: 6 WEEKS    INR Location Clinic      Anticoagulation Summary as of 2/23/2018     INR goal 2.0-3.0   Today's INR 2.9   Maintenance plan 5 mg (5 mg x 1) every day   Full instructions 5 mg every day   Weekly total 35 mg   No change documented Leticia Antunez RN   Plan last modified Leticia Antunez RN (12/23/2016)   Next INR check 4/6/2018   Priority INR   Target end date Indefinite    Indications   Long-term (current) use of anticoagulants [Z79.01] [Z79.01]  Atrial fibrillation with rapid ventricular response (H) (Resolved) [I48.91]         Anticoagulation Episode Summary     INR check location     Preferred lab     Send INR reminders to [facility]    Comments       Anticoagulation Care Providers     Provider Role Specialty Phone number    Fred Del Valle MD [facility] [phone]            See the Encounter Report to view Anticoagulation Flowsheet and Dosing Calendar (Go to Encounters tab in chart review, and find the Anticoagulation Therapy Visit)        Leticia Antunez, RN

## 2018-03-07 DIAGNOSIS — I10 ESSENTIAL HYPERTENSION, BENIGN: ICD-10-CM

## 2018-03-07 DIAGNOSIS — I50.33 ACUTE ON CHRONIC DIASTOLIC CONGESTIVE HEART FAILURE (H): ICD-10-CM

## 2018-03-07 RX ORDER — METOPROLOL TARTRATE 50 MG
TABLET ORAL
Qty: 180 TABLET | Refills: 0 | Status: SHIPPED | OUTPATIENT
Start: 2018-03-07 | End: 2018-04-04

## 2018-03-07 NOTE — TELEPHONE ENCOUNTER
Prescription approved per [facility] Refill Protocol.    Danielle GARCES RN, BSN, PHN  [place] [facility] RN

## 2018-03-07 NOTE — TELEPHONE ENCOUNTER
"Requested Prescriptions   Pending Prescriptions Disp Refills     metoprolol tartrate (LOPRESSOR) 50 MG tablet [Pharmacy Med Name: METOPROLOL TARTRATE 50MG TABS] 180 tablet 0     Sig: TAKE THREE TABLETS BY MOUTH TWICE A DAY    Beta-Blockers Protocol Passed    3/7/2018 11:04 AM       Passed - Blood pressure under 140/90 in past 12 months    BP Readings from Last 3 Encounters:   06/23/17 130/84   05/21/17 (!) 135/97   03/31/17 123/83                Passed - Patient is age 6 or older       Passed - Recent (12 mo) or future (30 days) visit within the authorizing provider's specialty    Patient had office visit in the last year or has a visit in the next 30 days with authorizing provider.  See \"Patient Info\" tab in inbasket, or \"Choose Columns\" in Meds & Orders section of the refill encounter.             Last Written Prescription Date:  2/6/2018  Last Fill Quantity: 180,  # refills: 0   Last office visit: 3/31/2017 with prescribing provider:  3/31/2017   Future Office Visit:      "

## 2018-04-04 DIAGNOSIS — I10 ESSENTIAL HYPERTENSION, BENIGN: ICD-10-CM

## 2018-04-04 DIAGNOSIS — I50.33 ACUTE ON CHRONIC DIASTOLIC CONGESTIVE HEART FAILURE (H): ICD-10-CM

## 2018-04-04 NOTE — TELEPHONE ENCOUNTER
"Requested Prescriptions   Pending Prescriptions Disp Refills     metoprolol tartrate (LOPRESSOR) 50 MG tablet [Pharmacy Med Name: METOPROLOL TARTRATE 50MG TABS] 180 tablet 0    Last Written Prescription Date:  3/7/18  Last Fill Quantity: 180,  # refills: 0   Last Office Visit: 3/31/2017   Future Office Visit:      Sig: TAKE THREE TABLETS BY MOUTH TWICE A DAY    Beta-Blockers Protocol Passed    4/4/2018  9:06 AM       Passed - Blood pressure under 140/90 in past 12 months    BP Readings from Last 3 Encounters:   06/23/17 130/84   05/21/17 (!) 135/97   03/31/17 123/83                Passed - Patient is age 6 or older       Passed - Recent (12 mo) or future (30 days) visit within the authorizing provider's specialty    Patient had office visit in the last 12 months or has a visit in the next 30 days with authorizing provider or within the authorizing provider's specialty.  See \"Patient Info\" tab in inbasket, or \"Choose Columns\" in Meds & Orders section of the refill encounter.              "

## 2018-04-09 RX ORDER — METOPROLOL TARTRATE 50 MG
TABLET ORAL
Qty: 180 TABLET | Refills: 0 | Status: SHIPPED | OUTPATIENT
Start: 2018-04-09 | End: 2018-04-10

## 2018-04-09 NOTE — TELEPHONE ENCOUNTER
One month extended with appointment message, can inform pt if calls back, also added note to INR appointment   Prescription approved per [facility] Refill Protocol.  Mallika Sharif RN, BSN

## 2018-04-10 ENCOUNTER — OFFICE VISIT (OUTPATIENT)
Dept: FAMILY MEDICINE | Facility: CLINIC | Age: 58
End: 2018-04-10
Payer: COMMERCIAL

## 2018-04-10 ENCOUNTER — ANTICOAGULATION THERAPY VISIT (OUTPATIENT)
Dept: NURSING | Facility: CLINIC | Age: 58
End: 2018-04-10
Payer: COMMERCIAL

## 2018-04-10 VITALS
HEART RATE: 60 BPM | HEIGHT: 69 IN | DIASTOLIC BLOOD PRESSURE: 71 MMHG | TEMPERATURE: 97.7 F | RESPIRATION RATE: 16 BRPM | OXYGEN SATURATION: 95 % | WEIGHT: 315 LBS | BODY MASS INDEX: 46.65 KG/M2 | SYSTOLIC BLOOD PRESSURE: 114 MMHG

## 2018-04-10 DIAGNOSIS — I10 ESSENTIAL HYPERTENSION, BENIGN: ICD-10-CM

## 2018-04-10 DIAGNOSIS — Z00.00 ENCOUNTER FOR WELLNESS EXAMINATION: ICD-10-CM

## 2018-04-10 DIAGNOSIS — Z79.01 LONG-TERM (CURRENT) USE OF ANTICOAGULANTS: ICD-10-CM

## 2018-04-10 DIAGNOSIS — I48.20 CHRONIC ATRIAL FIBRILLATION (H): ICD-10-CM

## 2018-04-10 DIAGNOSIS — K21.00 GASTROESOPHAGEAL REFLUX DISEASE WITH ESOPHAGITIS: ICD-10-CM

## 2018-04-10 DIAGNOSIS — R73.9 HYPERGLYCEMIA: Primary | ICD-10-CM

## 2018-04-10 DIAGNOSIS — I10 BENIGN ESSENTIAL HYPERTENSION: ICD-10-CM

## 2018-04-10 DIAGNOSIS — I50.33 ACUTE ON CHRONIC DIASTOLIC CONGESTIVE HEART FAILURE (H): ICD-10-CM

## 2018-04-10 LAB
ALBUMIN SERPL-MCNC: 3.5 G/DL (ref 3.4–5)
ALP SERPL-CCNC: 62 U/L (ref 40–150)
ALT SERPL W P-5'-P-CCNC: 33 U/L (ref 0–70)
ANION GAP SERPL CALCULATED.3IONS-SCNC: 9 MMOL/L (ref 3–14)
AST SERPL W P-5'-P-CCNC: 27 U/L (ref 0–45)
BILIRUB SERPL-MCNC: 0.7 MG/DL (ref 0.2–1.3)
BUN SERPL-MCNC: 29 MG/DL (ref 7–30)
CALCIUM SERPL-MCNC: 9 MG/DL (ref 8.5–10.1)
CHLORIDE SERPL-SCNC: 111 MMOL/L (ref 94–109)
CHOLEST SERPL-MCNC: 121 MG/DL
CO2 SERPL-SCNC: 22 MMOL/L (ref 20–32)
CREAT SERPL-MCNC: 1.29 MG/DL (ref 0.66–1.25)
CREAT UR-MCNC: 157 MG/DL
GFR SERPL CREATININE-BSD FRML MDRD: 57 ML/MIN/1.7M2
GLUCOSE SERPL-MCNC: 99 MG/DL (ref 70–99)
HBA1C MFR BLD: 5.7 % (ref 0–6.4)
HDLC SERPL-MCNC: 42 MG/DL
INR POINT OF CARE: 1.9 (ref 0.86–1.14)
LDLC SERPL CALC-MCNC: 59 MG/DL
MICROALBUMIN UR-MCNC: 466 MG/L
MICROALBUMIN/CREAT UR: 296.82 MG/G CR (ref 0–17)
NONHDLC SERPL-MCNC: 79 MG/DL
POTASSIUM SERPL-SCNC: 4.1 MMOL/L (ref 3.4–5.3)
PROT SERPL-MCNC: 7.5 G/DL (ref 6.8–8.8)
SODIUM SERPL-SCNC: 142 MMOL/L (ref 133–144)
TRIGL SERPL-MCNC: 100 MG/DL
TSH SERPL DL<=0.005 MIU/L-ACNC: 4.07 MU/L (ref 0.4–4)

## 2018-04-10 PROCEDURE — 85610 PROTHROMBIN TIME: CPT | Mod: QW

## 2018-04-10 PROCEDURE — 83036 HEMOGLOBIN GLYCOSYLATED A1C: CPT | Performed by: FAMILY MEDICINE

## 2018-04-10 PROCEDURE — 84443 ASSAY THYROID STIM HORMONE: CPT | Performed by: FAMILY MEDICINE

## 2018-04-10 PROCEDURE — 80061 LIPID PANEL: CPT | Performed by: FAMILY MEDICINE

## 2018-04-10 PROCEDURE — 99396 PREV VISIT EST AGE 40-64: CPT | Performed by: FAMILY MEDICINE

## 2018-04-10 PROCEDURE — 82043 UR ALBUMIN QUANTITATIVE: CPT | Performed by: FAMILY MEDICINE

## 2018-04-10 PROCEDURE — 99207 ZZC NO CHARGE NURSE ONLY: CPT

## 2018-04-10 PROCEDURE — 36416 COLLJ CAPILLARY BLOOD SPEC: CPT

## 2018-04-10 PROCEDURE — 80053 COMPREHEN METABOLIC PANEL: CPT | Performed by: FAMILY MEDICINE

## 2018-04-10 RX ORDER — SIMVASTATIN 20 MG
20 TABLET ORAL AT BEDTIME
Qty: 90 TABLET | Refills: 3 | Status: SHIPPED | OUTPATIENT
Start: 2018-04-10 | End: 2019-04-16

## 2018-04-10 RX ORDER — METOPROLOL TARTRATE 50 MG
TABLET ORAL
Qty: 540 TABLET | Refills: 0 | Status: SHIPPED | OUTPATIENT
Start: 2018-04-10 | End: 2018-08-07

## 2018-04-10 RX ORDER — LISINOPRIL 10 MG/1
10 TABLET ORAL DAILY
Qty: 90 TABLET | Refills: 3 | Status: SHIPPED | OUTPATIENT
Start: 2018-04-10 | End: 2019-04-16

## 2018-04-10 RX ORDER — TORSEMIDE 20 MG/1
20 TABLET ORAL DAILY
Qty: 90 TABLET | Refills: 3 | Status: SHIPPED | OUTPATIENT
Start: 2018-04-10 | End: 2019-04-16

## 2018-04-10 NOTE — MR AVS SNAPSHOT
"              After Visit Summary   4/10/2018    Nikunj Anthony    MRN: [id number]           Patient Information     Date Of Birth          1960        Visit Information        Provider Department      4/10/2018 10:30 AM Fred Del Valle MD [facility]        Today's Diagnoses     Hyperglycemia    -  1    Acute on chronic diastolic congestive heart failure (H)        Essential hypertension, benign        Benign essential hypertension        Chronic atrial fibrillation (H)        Gastroesophageal reflux disease with esophagitis and gastritis           Follow-ups after your visit        Your next 10 appointments already scheduled     Apr 10, 2018  2:30 PM CDT   Anticoagulation Visit with [facility] ANT[facility]ULATION CLINIC   [facility] ([facility])    [id number] [place] [address] [id number] [phone]              Who to contact     If you have questions or need follow up information about today's clinic visit or your schedule please contact [facility] directly at [phone].  Normal or non-critical lab and imaging results will be communicated to you by MyChart, letter or phone within 4 business days after the clinic has received the results. If you do not hear from us within 7 days, please contact the clinic through MyChart or phone. If you have a critical or abnormal lab result, we will notify you by phone as soon as possible.  Submit refill requests through Tickade or call your pharmacy and they will forward the refill request to us. Please allow 3 business days for your refill to be completed.          Additional Information About Your Visit        citibuddieshart Information     Tickade lets you send messages to your doctor, view your test results, renew your prescriptions, schedule appointments and more. To sign up, go to [email or web address] . Click on \"Log in\" on the left side of the screen, which will take you to the " "Welcome page. Then click on \"Sign up Now\" on the right side of the page.     You will be asked to enter the access code listed below, as well as some personal information. Please follow the directions to create your username and password.     Your access code is: [email or web address]  Expires: 2018 11:13 AM     Your access code will  in 90 days. If you need help or a new code, please call your [place] clinic or [phone].        Care EveryWhere ID     This is your Care EveryWhere ID. This could be used by other organizations to access your [place] medical records  [id number]        Your Vitals Were     Pulse Temperature Respirations Height Pulse Oximetry BMI (Body Mass Index)    60 97.7  F (36.5  C) (Oral) 16 5' 9.25\" (1.759 m) 95% 53.62 kg/m2       Blood Pressure from Last 3 Encounters:   04/10/18 114/71   17 130/84   17 (!) 135/97    Weight from Last 3 Encounters:   04/10/18 (!) 365 lb 11.2 oz (165.9 kg)   17 (!) 360 lb 14.4 oz (163.7 kg)   17 (!) 356 lb 14.4 oz (161.9 kg)              We Performed the Following     Albumin Random Urine Quantitative with Creat Ratio     Comprehensive metabolic panel     Hemoglobin A1c     Lipid panel reflex to direct LDL Fasting     TSH          Today's Medication Changes          These changes are accurate as of 4/10/18 11:13 AM.  If you have any questions, ask your nurse or doctor.               These medicines have changed or have updated prescriptions.        Dose/Directions    metoprolol tartrate 50 MG tablet   Commonly known as:  LOPRESSOR   This may have changed:  See the new instructions.   Used for:  Acute on chronic diastolic congestive heart failure (H), Essential hypertension, benign   Changed by:  Fred Del Valle MD        TAKE THREE TABLETS BY MOUTH TWICE A DAY   Quantity:  540 tablet   Refills:  0       simvastatin 20 MG tablet   Commonly known as:  ZOCOR   This may have changed:  See the new instructions.   Used for:  Chronic " atrial fibrillation (H)   Changed by:  Fred Del Valle MD        Dose:  20 mg   Take 1 tablet (20 mg) by mouth At Bedtime   Quantity:  90 tablet   Refills:  3       * torsemide 20 MG tablet   Commonly known as:  DEMADEX   This may have changed:  Another medication with the same name was changed. Make sure you understand how and when to take each.   Used for:  Acute on chronic diastolic congestive heart failure (H)   Changed by:  Fred Del Valle MD        Dose:  20 mg   Take 1 tablet (20 mg) by mouth daily   Quantity:  90 tablet   Refills:  3       * torsemide 20 MG tablet   Commonly known as:  DEMADEX   This may have changed:  See the new instructions.   Used for:  Acute on chronic diastolic congestive heart failure (H)   Changed by:  Fred Del Valle MD        Dose:  20 mg   Take 1 tablet (20 mg) by mouth daily   Quantity:  90 tablet   Refills:  3       * Notice:  This list has 2 medication(s) that are the same as other medications prescribed for you. Read the directions carefully, and ask your doctor or other care provider to review them with you.         Where to get your medicines      These medications were sent to [facility] [place] [id number]     Phone:  [phone]     lisinopril 10 MG tablet    metoprolol tartrate 50 MG tablet    omeprazole 20 MG CR capsule    simvastatin 20 MG tablet    torsemide 20 MG tablet                Primary Care Provider Office Phone # Fax #    Fred Del Valle [phone]        Goals        General    Medical (pt-stated)     Notes - Note created  2/29/2016  2:09 PM by Kelly Taylor, RN    weigh self daily and write down weight     As of today's date 2/29/2016 goal is met at 0 - 25%.   Goal Status:  Active           Weight    Weight below 200 lb (91 kg)       Equal Access to Services     AIDEN WARREN AH: Delmi acuña  jessica Mora, wascooterda luqadaha, qaybta kasarah dodson, eda fritzin hayaahelen finkmacy tahiraeriberto lacandidohelen nedra. So [facility] [phone].    ATENCIÓN: Si habla español, tiene a wnyn disposición servicios gratuitos de asistencia lingüística. Gato al [phone].    We comply with applicable federal civil rights laws and [place] laws. We do not discriminate on the basis of race, color, national origin, age, disability, sex, sexual orientation, or gender identity.            Thank you!     Thank you for choosing [facility]  for your care. Our goal is always to provide you with excellent care. Hearing back from our patients is one way we can continue to improve our services. Please take a few minutes to complete the written survey that you may receive in the mail after your visit with us. Thank you!             Your Updated Medication List - Protect others around you: Learn how to safely use, store and throw away your medicines at www.disposemymeds.org.          This list is accurate as of 4/10/18 11:13 AM.  Always use your most recent med list.                   Brand Name Dispense Instructions for use Diagnosis    acetaminophen 650 MG 8 hour tablet     100 tablet    Take 650 mg by mouth every 4 hours as needed for mild pain    Renal colic       ASPIRIN NOT PRESCRIBED    INTENTIONAL    0 each    Please choose reason not prescribed, below    ASVD (arteriosclerotic vascular disease)       ASPIRIN NOT PRESCRIBED    INTENTIONAL    0 each    Please choose reason not prescribed, below    ASVD (arteriosclerotic vascular disease), Chronic atrial fibrillation (H)       lisinopril 10 MG tablet    PRINIVIL/ZESTRIL    90 tablet    Take 1 tablet (10 mg) by mouth daily    Benign essential hypertension       metoprolol tartrate 50 MG tablet    LOPRESSOR    540 tablet    TAKE THREE TABLETS BY MOUTH TWICE A DAY    Acute on chronic diastolic congestive heart failure (H), Essential hypertension, benign       omeprazole 20 MG  CR capsule    priLOSEC    90 capsule    TAKE 1 CAPSULE BY MOUTH EVERY DAY    Gastroesophageal reflux disease with esophagitis       simvastatin 20 MG tablet    ZOCOR    90 tablet    Take 1 tablet (20 mg) by mouth At Bedtime    Chronic atrial fibrillation (H)       * torsemide 20 MG tablet    DEMADEX    90 tablet    Take 1 tablet (20 mg) by mouth daily    Acute on chronic diastolic congestive heart failure (H)       * torsemide 20 MG tablet    DEMADEX    90 tablet    Take 1 tablet (20 mg) by mouth daily    Acute on chronic diastolic congestive heart failure (H)       * WARFARIN SODIUM PO      Take 2.5 mg by mouth Every Wednesday and 5 mg ROW        * warfarin 5 MG tablet    COUMADIN    30 tablet    5mg qd OR instructed by INR clinic    Long term current use of anticoagulant therapy       * warfarin 5 MG tablet    COUMADIN    30 tablet    Take 1 tablet (5 mg) by mouth daily OR as instructed by INR clinic    Long term current use of anticoagulant therapy       * Notice:  This list has 5 medication(s) that are the same as other medications prescribed for you. Read the directions carefully, and ask your doctor or other care provider to review them with you.

## 2018-04-10 NOTE — LETTER
April 13, 2018      Nikunj Anthony  [address] [place] [address]        Dear ,    We are writing to inform you of your test results.    Blood and urine are pretty good for your diagnoses.  Keep up the reliable medication taking and work gradually to lower carbohydrates and take a little weight off to unload your heart condition.    Resulted Orders   Hemoglobin A1c   Result Value Ref Range    Hemoglobin A1C 5.7 0 - 6.4 %      Comment:      Normal <5.7% Prediabetes 5.7-6.4%  Diabetes 6.5% or higher - adopted from ADA   consensus guidelines.     Lipid panel reflex to direct LDL Fasting   Result Value Ref Range    Cholesterol 121 <200 mg/dL    Triglycerides 100 <150 mg/dL      Comment:      Fasting specimen    HDL Cholesterol 42 >39 mg/dL    LDL Cholesterol Calculated 59 <100 mg/dL      Comment:      Desirable:       <100 mg/dl    Non HDL Cholesterol 79 <130 mg/dL   Comprehensive metabolic panel   Result Value Ref Range    Sodium 142 133 - 144 mmol/L    Potassium 4.1 3.4 - 5.3 mmol/L    Chloride 111 (H) 94 - 109 mmol/L    Carbon Dioxide 22 20 - 32 mmol/L    Anion Gap 9 3 - 14 mmol/L    Glucose 99 70 - 99 mg/dL      Comment:      Fasting specimen    Urea Nitrogen 29 7 - 30 mg/dL    Creatinine 1.29 (H) 0.66 - 1.25 mg/dL    GFR Estimate 57 (L) >60 mL/min/1.7m2      Comment:      Non  GFR Calc    GFR Estimate If Black 69 >60 mL/min/1.7m2      Comment:       GFR Calc    Calcium 9.0 8.5 - 10.1 mg/dL    Bilirubin Total 0.7 0.2 - 1.3 mg/dL    Albumin 3.5 3.4 - 5.0 g/dL    Protein Total 7.5 6.8 - 8.8 g/dL    Alkaline Phosphatase 62 40 - 150 U/L    ALT 33 0 - 70 U/L    AST 27 0 - 45 U/L   TSH   Result Value Ref Range    TSH 4.07 (H) 0.40 - 4.00 mU/L   Albumin Random Urine Quantitative with Creat Ratio   Result Value Ref Range    Creatinine Urine 157 mg/dL    Albumin Urine mg/L 466 mg/L    Albumin Urine mg/g Cr 296.82 (H) 0 - 17 mg/g Cr       If you have any questions or  concerns, please call the clinic at the number listed above.       Sincerely,        Fred Del Valle MD

## 2018-04-10 NOTE — PROGRESS NOTES
"SUBJECTIVE:   CC: Nikunj Anthony is an 57 year old male who presents for preventative health visit.     Physical               Today's PHQ-2 Score:   PHQ-2 ( 1999 [employer]) 4/10/2018   Q1: Little interest or pleasure in doing things -   Q2: Feeling down, depressed or hopeless -   PHQ-2 Score -   Q1: Little interest or pleasure in doing things Not at all   Q2: Feeling down, depressed or hopeless Not at all   PHQ-2 Score 0     /71 (BP Location: Left arm, Patient Position: Chair, Cuff Size: Thigh)  Pulse 60  Temp 97.7  F (36.5  C) (Oral)  Resp 16  Ht 5' 9.25\" (1.759 m)  Wt (!) 365 lb 11.2 oz (165.9 kg)  SpO2 95%  BMI 53.62 kg/m2    Abuse: Current or Past(Physical, Sexual or Emotional)- No  Do you feel safe in your environment - Yes    Social History   Substance Use Topics     Smoking status: Former Smoker     Quit date: 1/1/1993     Smokeless tobacco: Never Used     Alcohol use No     No flowsheet data found.Last PSA: No results found for: PSA    Reviewed orders with patient. Reviewed health maintenance and updated orders accordingly - Yes  BP Readings from Last 3 Encounters:   04/10/18 114/71   06/23/17 130/84   05/21/17 (!) 135/97    Wt Readings from Last 3 Encounters:   04/10/18 (!) 365 lb 11.2 oz (165.9 kg)   06/23/17 (!) 360 lb 14.4 oz (163.7 kg)   03/31/17 (!) 356 lb 14.4 oz (161.9 kg)                    Reviewed and updated as needed this visit by clinical staff         Reviewed and updated as needed this visit by Provider            Review of Systems  C: NEGATIVE for fever, chills, change in weight  I: NEGATIVE for worrisome rashes, moles or lesions  E: NEGATIVE for vision changes or irritation  ENT: NEGATIVE for ear, mouth and throat problems  R: NEGATIVE for significant cough or SOB  CV: NEGATIVE for chest pain, palpitations or peripheral edema  GI: NEGATIVE for nausea, abdominal pain, heartburn, or change in bowel habits   male: negative for dysuria, hematuria, decreased urinary stream, " erectile dysfunction, urethral discharge  M: NEGATIVE for significant arthralgias or myalgia  N: NEGATIVE for weakness, dizziness or paresthesias  P: NEGATIVE for changes in mood or affect    OBJECTIVE:   On exam the vital signs are stable  Weight is Body mass index is 53.62 kg/(m^2).   Eyes show luis   No neck masses or thyromegaly.Ear nose and throat shows normal   No bruits, murmers, rubs or extrasounds. No cardiomegaly or chest wall tenderness. Lungs clear, no abdominal masses or organomegaly. No CVA tenderness.  Skin eval no rash   No hernias, good range of motion neck, back and extremities. No abnormal skin lesions. Normal genitalia. Good peripheral pulses. No adenopathy.  Normal gait and stance. Neck is supple.  Back exam shows good rom     He's not motivated to get exercise or eat any differently, He has progressive diseases        Physical Exam  GENERAL: healthy, alert and no distress  EYES: Eyes grossly normal to inspection, PERRL and conjunctivae and sclerae normal  HENT: ear canals and TM's normal, nose and mouth without ulcers or lesions  NECK: no adenopathy, no asymmetry, masses, or scars and thyroid normal to palpation  RESP: lungs clear to auscultation - no rales, rhonchi or wheezes  CV: regular rate and rhythm, normal S1 S2, no S3 or S4, no murmur, click or rub, no peripheral edema and peripheral pulses strong  ABDOMEN: soft, nontender, no hepatosplenomegaly, no masses and bowel sounds normal   (male): normal male genitalia without lesions or urethral discharge, no hernia  RECTAL: normal sphincter tone, no rectal masses, prostate normal size, smooth, nontender without nodules or masses  MS: no gross musculoskeletal defects noted, no edema  SKIN: no suspicious lesions or rashes  NEURO: Normal strength and tone, mentation intact and speech normal  PSYCH: mentation appears normal, affect normal/bright  LYMPH: no cervical, supraclavicular, axillary, or inguinal adenopathy    ASSESSMENT/PLAN:    Medicare Wellness Exam   He actually lives in [place] and isn't in the state long enough to work with [facility] on a Wellness program, discussed potential conttact as he needs the follow up     (R73.9) Hyperglycemia  (primary encounter diagnosis)  Comment:   Plan: Hemoglobin A1c, TSH, Albumin Random Urine         Quantitative with Creat Ratio        Diabetic foot exam, loss of sensation, no sores, not well motivated     (I50.33) Acute on chronic diastolic congestive heart failure (H)  Comment:   Plan: metoprolol tartrate (LOPRESSOR) 50 MG tablet,         torsemide (DEMADEX) 20 MG tablet, Lipid panel         reflex to direct LDL Fasting, Comprehensive         metabolic panel        Has [facility] follow [facility] pending     (I10) Essential hypertension, benign  Comment:   Plan: metoprolol tartrate (LOPRESSOR) 50 MG tablet,         Albumin Random Urine Quantitative with Creat         Ratio          Current Outpatient Prescriptions   Medication     metoprolol tartrate (LOPRESSOR) 50 MG tablet     lisinopril (PRINIVIL/ZESTRIL) 10 MG tablet     simvastatin (ZOCOR) 20 MG tablet     omeprazole (PRILOSEC) 20 MG CR capsule     torsemide (DEMADEX) 20 MG tablet     warfarin (COUMADIN) 5 MG tablet     ASPIRIN NOT PRESCRIBED (INTENTIONAL)     warfarin (COUMADIN) 5 MG tablet     ASPIRIN NOT PRESCRIBED (INTENTIONAL)     torsemide (DEMADEX) 20 MG tablet     WARFARIN SODIUM PO     acetaminophen 650 MG TABS     No current facility-administered medications for this visit.          (I10) Benign essential hypertension  Comment:   Plan: lisinopril (PRINIVIL/ZESTRIL) 10 MG tablet          Discuss weight loss and salt reduction   (I48.2) Chronic atrial fibrillation (H)  Comment:   Plan: simvastatin (ZOCOR) 20 MG tablet, Lipid panel         reflex to direct LDL Fasting, Comprehensive         metabolic panel          He says he's compliant   (K21.0) Gastroesophageal reflux disease with esophagitis and gastritis  Comment:   Plan: omeprazole (PRILOSEC) 20  "MG CR capsule              COUNSELING:   Reviewed preventive health counseling, as reflected in patient instructions  Special attention given to:        Regular exercise       Healthy diet/nutrition         reports that he quit smoking about 25 years ago. He has never used smokeless tobacco.    Estimated body mass index is 52.91 kg/(m^2) as calculated from the following:    Height as of 6/23/17: 5' 9.25\" (1.759 m).    Weight as of 6/23/17: 360 lb 14.4 oz (163.7 kg).   Weight management plan: Discussed healthy diet and exercise guidelines and patient will follow up in 3 months in clinic to re-evaluate.    Counseling Resources:  ATP IV Guidelines  Pooled Cohorts Equation Calculator  FRAX Risk Assessment  ICSI Preventive Guidelines  Dietary Guidelines for Americans, 2010  USDA's MyPlate  ASA Prophylaxis  Lung CA Screening    Fred Del Valle MD  [place] [facility]  "

## 2018-04-10 NOTE — PROGRESS NOTES
ANTICOAGULATION FOLLOW-UP CLINIC VISIT    Patient Name:  Nikunj Anthony  Date:  4/10/2018  Contact Type:  Face to Face    SUBJECTIVE:     Patient Findings     Positives No Problem Findings, Unexplained INR or factor level change    Comments Saw PCP today for annual physical, no changes or concerns.           OBJECTIVE    INR Protime   Date Value Ref Range Status   04/10/2018 1.9 (A) 0.86 - 1.14 Final       ASSESSMENT / PLAN  INR assessment THER    Recheck INR In: 4 WEEKS    INR Location Clinic      Anticoagulation Summary as of 4/10/2018     INR goal 2.0-3.0   Today's INR 1.9!   Maintenance plan 5 mg (5 mg x 1) every day   Full instructions 4/10: 7.5 mg; Otherwise 5 mg every day   Weekly total 35 mg   Plan last modified Leticia Antunez RN (12/23/2016)   Next INR check 5/8/2018   Priority INR   Target end date Indefinite    Indications   Long-term (current) use of anticoagulants [Z79.01] [Z79.01]  Atrial fibrillation with rapid ventricular response (H) (Resolved) [I48.91]         Anticoagulation Episode Summary     INR check location     Preferred lab     Send INR reminders to [facility]    Comments       Anticoagulation Care Providers     Provider Role Specialty Phone number    Fred Del Valle MD [facility] [phone]            See the Encounter Report to view Anticoagulation Flowsheet and Dosing Calendar (Go to Encounters tab in chart review, and find the Anticoagulation Therapy Visit)        Leticia Antunez RN

## 2018-04-10 NOTE — MR AVS SNAPSHOT
Nikunjnichole Anthony   4/10/2018 2:30 PM   Anticoagulation Therapy Visit    Description:  57 year old male   Provider:  CR ANTICOAGULATION CLINIC   Department:  Cr Nurse           INR as of 4/10/2018     Today's INR 1.9!      Anticoagulation Summary as of 4/10/2018     INR goal 2.0-3.0   Today's INR 1.9!   Full instructions 4/10: 7.5 mg; Otherwise 5 mg every day   Next INR check 5/8/2018    Indications   Long-term (current) use of anticoagulants [Z79.01] [Z79.01]  Atrial fibrillation with rapid ventricular response (H) (Resolved) [I48.91]         Your next Anticoagulation Clinic appointment(s)     Apr 10, 2018  2:30 PM CDT   Anticoagulation Visit with [facility] ANTICOAGULATION CLINIC   [facility] ([facility])    [address] [place] [id number]   [phone]              Contact Numbers     Clinic Number:         April 2018 Details    Sun Mon Tue Wed Thu Fri Sat     1               2               3               4               5               6               7                 8               9               10      7.5 mg   See details      11      5 mg         12      5 mg         13      5 mg         14      5 mg           15      5 mg         16      5 mg         17      5 mg         18      5 mg         19      5 mg         20      5 mg         21      5 mg           22      5 mg         23      5 mg         24      5 mg         25      5 mg         26      5 mg         27      5 mg         28      5 mg           29      5 mg         30      5 mg               Date Details   04/10 This INR check               How to take your warfarin dose     To take:  5 mg Take 1 of the 5 mg tablets.    To take:  7.5 mg Take 1.5 of the 5 mg tablets.           May 2018 Details    Sun Mon Tue Wed Thu Fri Sat       1      5 mg         2      5 mg         3      5 mg         4      5 mg         5      5 mg           6      5 mg         7      5 mg         8            9                10               11               12                 13               14               15               16               17               18               19                 20               21               22               23               24               25               26                 27               28               29               30               31                  Date Details   No additional details    Date of next INR:  5/8/2018         How to take your warfarin dose     To take:  5 mg Take 1 of the 5 mg tablets.

## 2018-04-12 DIAGNOSIS — Z79.01 LONG TERM CURRENT USE OF ANTICOAGULANT THERAPY: ICD-10-CM

## 2018-04-12 RX ORDER — WARFARIN SODIUM 5 MG/1
TABLET ORAL
Qty: 30 TABLET | Refills: 1 | Status: SHIPPED | OUTPATIENT
Start: 2018-04-12 | End: 2018-06-13

## 2018-04-12 NOTE — TELEPHONE ENCOUNTER
Prescription approved per [facility] Refill Protocol.  Jaun Francisco Fernandez RN, BSN

## 2018-04-12 NOTE — TELEPHONE ENCOUNTER
"Requested Prescriptions   Pending Prescriptions Disp Refills     warfarin (COUMADIN) 5 MG tablet [Pharmacy Med Name: WARFARIN SODIUM 5MG TABS] 30 tablet 1    Last Written Prescription Date:  12/1/17  Last Fill Quantity: 30,  # refills: 3   Last Office Visit: 4/10/2018   Future Office Visit:      Sig: TAKE ONE TABLET BY MOUTH EVERY DAY OR PER COAG CLINIC    Vitamin K Antagonists Failed    4/12/2018  9:19 AM       Failed - INR is within goal in the past 6 weeks    Confirm INR is within goal in the past 6 weeks.     Recent Labs   Lab Test 04/10/18   INR  1.9*   Date and Result of Last PT/INR:   Lab Results   Component Value Date    INR 1.9 04/10/2018    INR 2.9 02/23/2018    INR 2.25 05/21/2017    INR 1.58 02/26/2016           Passed - Recent (12 mo) or future (30 days) visit within the authorizing provider's specialty    Patient had office visit in the last 12 months or has a visit in the next 30 days with authorizing provider or within the authorizing provider's specialty.  See \"Patient Info\" tab in inbasket, or \"Choose Columns\" in Meds & Orders section of the refill encounter.           Passed - Patient is 18 years of age or older          "

## 2018-05-27 ENCOUNTER — TRANSFERRED RECORDS (OUTPATIENT)
Dept: HEALTH INFORMATION MANAGEMENT | Facility: CLINIC | Age: 58
End: 2018-05-27

## 2018-05-28 ENCOUNTER — TRANSFERRED RECORDS (OUTPATIENT)
Dept: HEALTH INFORMATION MANAGEMENT | Facility: CLINIC | Age: 58
End: 2018-05-28

## 2018-06-07 ENCOUNTER — ANTICOAGULATION THERAPY VISIT (OUTPATIENT)
Dept: NURSING | Facility: CLINIC | Age: 58
End: 2018-06-07
Payer: COMMERCIAL

## 2018-06-07 ENCOUNTER — OFFICE VISIT (OUTPATIENT)
Dept: CARDIOLOGY | Facility: CLINIC | Age: 58
End: 2018-06-07
Payer: COMMERCIAL

## 2018-06-07 VITALS
BODY MASS INDEX: 45.1 KG/M2 | DIASTOLIC BLOOD PRESSURE: 72 MMHG | SYSTOLIC BLOOD PRESSURE: 100 MMHG | HEART RATE: 96 BPM | HEIGHT: 70 IN | WEIGHT: 315 LBS

## 2018-06-07 DIAGNOSIS — Z79.01 LONG-TERM (CURRENT) USE OF ANTICOAGULANTS: ICD-10-CM

## 2018-06-07 DIAGNOSIS — I10 ESSENTIAL HYPERTENSION, BENIGN: ICD-10-CM

## 2018-06-07 DIAGNOSIS — I48.20 CHRONIC ATRIAL FIBRILLATION (H): ICD-10-CM

## 2018-06-07 DIAGNOSIS — E11.22 TYPE 2 DIABETES MELLITUS WITH STAGE 3 CHRONIC KIDNEY DISEASE, WITHOUT LONG-TERM CURRENT USE OF INSULIN (H): ICD-10-CM

## 2018-06-07 DIAGNOSIS — N18.30 TYPE 2 DIABETES MELLITUS WITH STAGE 3 CHRONIC KIDNEY DISEASE, WITHOUT LONG-TERM CURRENT USE OF INSULIN (H): ICD-10-CM

## 2018-06-07 DIAGNOSIS — I42.8 CARDIOMYOPATHY, NONISCHEMIC (H): Primary | ICD-10-CM

## 2018-06-07 DIAGNOSIS — E66.01 MORBID OBESITY (H): ICD-10-CM

## 2018-06-07 LAB — INR POINT OF CARE: 3 (ref 0.86–1.14)

## 2018-06-07 PROCEDURE — 99207 ZZC NO CHARGE NURSE ONLY: CPT

## 2018-06-07 PROCEDURE — 99214 OFFICE O/P EST MOD 30 MIN: CPT | Performed by: INTERNAL MEDICINE

## 2018-06-07 PROCEDURE — 36416 COLLJ CAPILLARY BLOOD SPEC: CPT

## 2018-06-07 PROCEDURE — 85610 PROTHROMBIN TIME: CPT | Mod: QW

## 2018-06-07 NOTE — MR AVS SNAPSHOT
After Visit Summary   6/7/2018    Nikunj Anthony    MRN: [id number]           Patient Information     Date Of Birth          1960        Visit Information        Provider Department      6/7/2018 1:30 PM Chris Mcdowell MD [facility]         Follow-ups after your visit        Your next 10 appointments already scheduled     Jun 07, 2018  3:15 PM CDT   Anticoagulation Visit with [facility]   [facility]    [id number] [place] [address] [phone]            Jun 08, 2018  1:00 PM CDT   Office Visit with Fred Del Valle MD   [facility]    [address] [facility] [address] [phone]           Bring a current list of meds and any records pertaining to this visit. For Physicals, please bring immunization records and any forms needing to be filled out. Please arrive 10 minutes early to complete paperwork.            Aug 30, 2018  2:45 PM CDT   Return Visit with Wale Fischer MD   [facility] ([facility])    [address] [address] [id number] [phone]              Who to contact     If you have questions or need follow up information about today's clinic visit or your schedule please contact [facility] directly at [phone].  Normal or non-critical lab and imaging results will be communicated to you by MyChart, letter or phone within 4 business days after the clinic has received the results. If you do not hear from us within 7 days, please contact the clinic through MyChart or phone. If you have a critical or abnormal lab result, we will notify you by phone as soon as possible.  Submit refill requests through [employer] or call your pharmacy and they will forward the  "refill request to us. Please allow 3 business days for your refill to be completed.          Additional Information About Your Visit        Care EveryWhere ID     This is your Care EveryWhere ID. This could be used by other organizations to access your [place] medical records  [id number]        Your Vitals Were     Pulse Height BMI (Body Mass Index)             96 1.778 m (5' 10\") 51.07 kg/m2          Blood Pressure from Last 3 Encounters:   06/07/18 100/72   04/10/18 114/71   06/23/17 130/84    Weight from Last 3 Encounters:   06/07/18 (!) 161.4 kg (355 lb 14.4 oz)   04/10/18 (!) 165.9 kg (365 lb 11.2 oz)   06/23/17 (!) 163.7 kg (360 lb 14.4 oz)              Today, you had the following     No orders found for display       Primary Care Provider Office Phone # Fax #    Fred Ignacio Del Valle [phone] [address] [facility] [address]        Goals        General    Medical (pt-stated)     Notes - Note created  2/29/2016  2:09 PM by Kelly Taylor, RN    weigh self daily and write down weight     As of today's date 2/29/2016 goal is met at 0 - 25%.   Goal Status:  Active           Weight    Weight below 91 kg (200 lb)       Equal Access to Services     AIDEN WARREN : Hadii aad ku hadasho Soomaali, waaxda luqadaha, qaybta kaalmada ivory, eda sneed. So [facility] [phone].    ATENCIÓN: Si habla español, tiene a wynn disposición servicios gratuitos de asistencia lingüística. Llame al [phone].    We comply with applicable federal civil rights laws and [place] laws. We do not discriminate on the basis of race, color, national origin, age, disability, sex, sexual orientation, or gender identity.            Thank you!     Thank you for choosing [facility]  for your care. Our goal is always to provide you with excellent care. Hearing back from our patients is one way we can continue to improve our " services. Please take a few minutes to complete the written survey that you may receive in the mail after your visit with us. Thank you!             Your Updated Medication List - Protect others around you: Learn how to safely use, store and throw away your medicines at www.disposemymeds.org.          This list is accurate as of 6/7/18  1:52 PM.  Always use your most recent med list.                   Brand Name Dispense Instructions for use Diagnosis    acetaminophen 650 MG 8 hour tablet     100 tablet    Take 650 mg by mouth every 4 hours as needed for mild pain    Renal colic       ASPIRIN NOT PRESCRIBED    INTENTIONAL    0 each    Please choose reason not prescribed, below    ASVD (arteriosclerotic vascular disease), Chronic atrial fibrillation (H)       lisinopril 10 MG tablet    PRINIVIL/ZESTRIL    90 tablet    Take 1 tablet (10 mg) by mouth daily    Benign essential hypertension       metoprolol tartrate 50 MG tablet    LOPRESSOR    540 tablet    TAKE THREE TABLETS BY MOUTH TWICE A DAY    Acute on chronic diastolic congestive heart failure (H), Essential hypertension, benign       omeprazole 20 MG CR capsule    priLOSEC    90 capsule    TAKE 1 CAPSULE BY MOUTH EVERY DAY    Gastroesophageal reflux disease with esophagitis       simvastatin 20 MG tablet    ZOCOR    90 tablet    Take 1 tablet (20 mg) by mouth At Bedtime    Chronic atrial fibrillation (H)       torsemide 20 MG tablet    DEMADEX    90 tablet    Take 1 tablet (20 mg) by mouth daily    Acute on chronic diastolic congestive heart failure (H)       warfarin 5 MG tablet    COUMADIN    30 tablet    TAKE ONE TABLET BY MOUTH EVERY DAY OR PER COAG CLINIC    Long term current use of anticoagulant therapy

## 2018-06-07 NOTE — LETTER
6/7/2018      Fred Del Valle MD  [address]  [facility] [address]      RE: Nikunj Anthony       Dear Colleague,    I had the pleasure of seeing Nikunj Anthony in the [facility].    Service Date: 06/07/2018      PRIMARY CARE PHYSICIAN:  Dr. Fred Del Valle.      HISTORY OF PRESENT ILLNESS:  I had the pleasure of seeing your patient, Nikunj Anthony, at [facility] for evaluation of morbid obesity, permanent atrial fibrillation with at times rapid ventricular response and a nonischemic cardiomyopathy with ejection fraction of 35%.  This patient is normally followed by Dr. Fischer.  The patient was severely short of breath and sought medical attention on 05/27/2018 at [facility] in [place], [place].  He was felt to be in congestive heart failure.  Atrial fibrillation was noted with rapid ventricular response.  The patient suffers from morbid obesity and stage III chronic kidney disease.  His diet is poor and he readily offers the fact that he consumed excessive salty foods the day prior to his admission to the hospital.  He notes TV dinner and chicken patties.  He also felt as though he was developing a viral upper respiratory infection.  He denied any peripheral edema but noted increasing shortness of breath.  He has been a nonsmoker for 25 years.  In 2016 the patient was hospitalized for what was felt to be angina and a heart catheterization showed no evidence of significant coronary artery disease.  His LVEDP was markedly elevated at greater than 40 mmHg and he was started on torsemide.  The patient was offered implantation of a cardio-defibrillator which he rejected.  Additionally he has been unwilling to undergo a sleep study.  He sleeps in a recliner as he feels oxygen-starved when laying flat.  He denies any recent bleeding.  He remains on warfarin therapy with INRs being followed at [facility].     "  PHYSICAL EXAMINATION:  See physical exam below.      ASSESSMENT:   1.  Nikunj Grissom is a pleasant 57-year-old gentleman with a nonischemic cardiomyopathy and an ejection fraction previously of 35%.  This is likely due to multiple factors including morbid obesity, atrial fibrillation with at times rapid ventricular response.  I suggested to this patient that a 24-hour Holter monitor would be helpful to determine whether his atrial fibrillation rate is controlled.  The patient refuses this monitor.  Additionally he refuses to increase his metoprolol dose.  He believes \"this is enough metoprolol at this point\".  The patient's lungs are currently clear and I have not increased his torsemide dose.  His weight is now stable after a 10-pound weight loss.   2.  This patient has atrial fibrillation with at times rapid ventricular response.  He remains on warfarin.  His INR is being followed by his primary care physician.   3.  The patient is noncompliant with diet and exercise.  His eating habits are atrocious.  He is unlikely to make any changes in his eating habits based on my interview.   4.  This patient likely has obstructive sleep apnea but is unwilling to undergo any testing.      It is my intention to see this patient again on a p.r.n. basis.  He does have an appointment with Dr. Fischer .  Since the patient is noncompliant with diet and exercise but does take his medications but is unwilling to change the medication doses it will be very difficult to care for him in the future.  It is my intention to see him again on a p.r.n. basis and he will follow up with Dr. Fischer as scheduled.      Milena Hurt MD       cc:   [place] [address]         MILE[facility] HURT MD, [facility]             D: 2018   T: 2018   MT: ANNMARIE      Name:     NIKUNJ GRISSOM   MRN:      [id number]        Account:      [id number]   :      " 1960           Service Date: 06/07/2018      Document: [id number]         Outpatient Encounter Prescriptions as of 6/7/2018   Medication Sig Dispense Refill     acetaminophen 650 MG TABS Take 650 mg by mouth every 4 hours as needed for mild pain 100 tablet 0     ASPIRIN NOT PRESCRIBED (INTENTIONAL) Please choose reason not prescribed, below 0 each 0     lisinopril (PRINIVIL/ZESTRIL) 10 MG tablet Take 1 tablet (10 mg) by mouth daily 90 tablet 3     metoprolol tartrate (LOPRESSOR) 50 MG tablet TAKE THREE TABLETS BY MOUTH TWICE A  tablet 0     omeprazole (PRILOSEC) 20 MG CR capsule TAKE 1 CAPSULE BY MOUTH EVERY DAY 90 capsule 3     simvastatin (ZOCOR) 20 MG tablet Take 1 tablet (20 mg) by mouth At Bedtime 90 tablet 3     torsemide (DEMADEX) 20 MG tablet Take 1 tablet (20 mg) by mouth daily 90 tablet 3     warfarin (COUMADIN) 5 MG tablet TAKE ONE TABLET BY MOUTH EVERY DAY OR PER COAG CLINIC 30 tablet 1     [DISCONTINUED] ASPIRIN NOT PRESCRIBED (INTENTIONAL) Please choose reason not prescribed, below 0 each 0     [DISCONTINUED] torsemide (DEMADEX) 20 MG tablet Take 1 tablet (20 mg) by mouth daily 90 tablet 3     [DISCONTINUED] warfarin (COUMADIN) 5 MG tablet 5mg qd OR instructed by INR clinic 30 tablet 5     [DISCONTINUED] WARFARIN SODIUM PO Take 2.5 mg by mouth Every Wednesday and 5 mg ROW       No facility-administered encounter medications on file as of 6/7/2018.        Again, thank you for allowing me to participate in the care of your patient.      Sincerely,    Chris Mcdowell MD     [facility]

## 2018-06-07 NOTE — LETTER
6/7/2018    Fred Del Valle MD  [address] [facility] [address]    RE: Nikunj Gabrielle       Dear Colleague,    I had the pleasure of seeing Nikunj Gabrielle in the [facility].    HPI and Plan:   See dictation:[id number]    No orders of the defined types were placed in this encounter.      No orders of the defined types were placed in this encounter.      Medications Discontinued During This Encounter   Medication Reason     ASPIRIN NOT PRESCRIBED (INTENTIONAL) Medication Reconciliation Clean Up     torsemide (DEMADEX) 20 MG tablet Medication Reconciliation Clean Up     warfarin (COUMADIN) 5 MG tablet Medication Reconciliation Clean Up     WARFARIN SODIUM PO Medication Reconciliation Clean Up         No diagnosis found.    CURRENT MEDICATIONS:  Current Outpatient Prescriptions   Medication Sig Dispense Refill     acetaminophen 650 MG TABS Take 650 mg by mouth every 4 hours as needed for mild pain 100 tablet 0     ASPIRIN NOT PRESCRIBED (INTENTIONAL) Please choose reason not prescribed, below 0 each 0     lisinopril (PRINIVIL/ZESTRIL) 10 MG tablet Take 1 tablet (10 mg) by mouth daily 90 tablet 3     metoprolol tartrate (LOPRESSOR) 50 MG tablet TAKE THREE TABLETS BY MOUTH TWICE A  tablet 0     omeprazole (PRILOSEC) 20 MG CR capsule TAKE 1 CAPSULE BY MOUTH EVERY DAY 90 capsule 3     simvastatin (ZOCOR) 20 MG tablet Take 1 tablet (20 mg) by mouth At Bedtime 90 tablet 3     torsemide (DEMADEX) 20 MG tablet Take 1 tablet (20 mg) by mouth daily 90 tablet 3     warfarin (COUMADIN) 5 MG tablet TAKE ONE TABLET BY MOUTH EVERY DAY OR PER COAG CLINIC 30 tablet 1     [DISCONTINUED] torsemide (DEMADEX) 20 MG tablet Take 1 tablet (20 mg) by mouth daily 90 tablet 3     [DISCONTINUED] warfarin (COUMADIN) 5 MG tablet 5mg qd OR instructed by INR clinic 30 tablet 5     [DISCONTINUED] WARFARIN SODIUM PO Take 2.5 mg by mouth Every Wednesday and 5 mg ROW         ALLERGIES   No Known  Allergies    PAST MEDICAL HISTORY:  Past Medical History:   Diagnosis Date     Atherosclerotic heart disease     cardiac cath 2016: minimal disease     Atrial fibrillation (H)      Cardiomyopathy, nonischemic (H)      Chronic systolic congestive heart failure (H)      Diabetes mellitus      High cholesterol      Hypertension      Left ventricular diastolic dysfunction      Morbid obesity (H) 10/30/2015     Pulmonary edema 8/17/2015     Type 2 diabetes mellitus with diabetic chronic kidney disease (H) 8/17/2015       PAST SURGICAL HISTORY:  Past Surgical History:   Procedure Laterality Date     ANGIOGRAM  02/25/2016    Minimal coronary artery disease and no stenosis greater than 10% to at most 20%. Nonischemic cardiomyopathy, ejection fraction estimated to be 30%. Left ventricular end-diastolic pressure of 40 mmHg. No significant mitral regurgitation. Recommendations: Maximal medical management. Daily exercise and weight loss     COMBINED CYSTOSCOPY, RETROGRADES, URETEROSCOPY, INSERT STENT Left 5/13/2015    Procedure: COMBINED CYSTOSCOPY, RETROGRADES, URETEROSCOPY, INSERT STENT;  Surgeon: Jasen Medina MD;  Location:  OR       FAMILY HISTORY:  Family History   Problem Relation Age of Onset     CANCER Sister        SOCIAL HISTORY:  Social History     Social History     Marital status: Single     Spouse name: N/A     Number of children: N/A     Years of education: N/A     Social History Main Topics     Smoking status: Former Smoker     Quit date: 1/1/1993     Smokeless tobacco: Never Used     Alcohol use No     Drug use: No     Sexual activity: No     Other Topics Concern     Caffeine Concern No     occas. coke and, Iced Tea     Special Diet No     watches sodium     Exercise No     walking some      Social History Narrative       Review of Systems:  Skin:  Negative       Eyes:  Positive for glasses    ENT:  Negative      Respiratory:  Negative       Cardiovascular:  Negative      Gastroenterology: Negative   "    Genitourinary:  not assessed      Musculoskeletal:  Positive for back pain    Neurologic:  Negative      Psychiatric:  Negative      Heme/Lymph/Imm:  Negative      Endocrine:  Negative        Physical Exam:  Vitals: /72 (BP Location: Right arm, Patient Position: Sitting, Cuff Size: Adult Large)  Pulse 96  Ht 1.778 m (5' 10\")  Wt (!) 161.4 kg (355 lb 14.4 oz)  BMI 51.07 kg/m2    Constitutional:  well developed;in no acute distress;cooperative morbidly obese      Skin:  warm and dry to the touch     rosacia    Head:  normocephalic, no masses or lesions        Eyes:  sclera white;pupils equal and round;conjunctivae and lids unremarkable;no xanthalasma        Lymph:      ENT:  no pallor or cyanosis poor dentition      Neck:  carotid pulses are full and equal bilaterally, JVP normal, no carotid bruit        Respiratory:  normal breath sounds, clear to auscultation, normal A-P diameter, normal symmetry, normal respiratory excursion, no use of accessory muscles         Cardiac: normal S1 and S2;no S3 or S4 irregularly irregular rhythm distant heart sounds no presence of murmur          pulses full and equal                                        GI:  abdomen soft obese      Extremities and Muscular Skeletal:  no deformities, clubbing, cyanosis, erythema observed              Neurological:  no gross motor deficits;affect appropriate        Psych:  Alert and Oriented x 3        CC  No referring provider defined for this encounter.                Thank you for allowing me to participate in the care of your patient.      Sincerely,     Chris Mcdowell MD     [facility]    cc:   No referring provider defined for this encounter.        "

## 2018-06-07 NOTE — PROGRESS NOTES
"  ANTICOAGULATION FOLLOW-UP CLINIC VISIT    Patient Name:  Nikunj Anthony  Date:  6/7/2018  Contact Type:  Face to Face    SUBJECTIVE:     Patient Findings     Positives Other complaints (URI symptoms last week, feeling better now.), OTC meds (Was taking Coricedin for URI symptoms)    Comments Hospitalized 05/27 overnight in [place], [place] due to \"fluid in the lungs\".  Patient states no changes in medications and states his INR was 2.2.  Patient living in [place], [place] so he will call for his next INR when he is in town.  Patient saw Dr. Mcdowell, cardiologist, today--no changes per Misael.           OBJECTIVE    INR Protime   Date Value Ref Range Status   06/07/2018 3.0 (A) 0.86 - 1.14 Final       ASSESSMENT / PLAN  INR assessment THER    Recheck INR In: 6 WEEKS    INR Location Clinic      Anticoagulation Summary as of 6/7/2018     INR goal 2.0-3.0   Today's INR 3.0   Warfarin maintenance plan 5 mg (5 mg x 1) every day   Full warfarin instructions 5 mg every day   Weekly warfarin total 35 mg   No change documented Leticia Antunez RN   Plan last modified Leticia Antunez, RN (12/23/2016)   Next INR check 7/19/2018   Priority INR   Target end date Indefinite    Indications   Long-term (current) use of anticoagulants [Z79.01] [Z79.01]  Atrial fibrillation with rapid ventricular response (H) (Resolved) [I48.91]         Anticoagulation Episode Summary     INR check location     Preferred lab     Send INR reminders to [facility]    Comments       Anticoagulation Care Providers     Provider Role Specialty Phone number    Fred Del Valle MD [facility] [phone]            See the Encounter Report to view Anticoagulation Flowsheet and Dosing Calendar (Go to Encounters tab in chart review, and find the Anticoagulation Therapy Visit)        Leticia Antunez RN               "

## 2018-06-07 NOTE — MR AVS SNAPSHOT
Nikunj Gabrielle   6/7/2018 3:15 PM   Anticoagulation Therapy Visit    Description:  57 year old male   Provider:  [facility]   Department:  Cr Nurse           INR as of 6/7/2018     Today's INR 3.0      Anticoagulation Summary as of 6/7/2018     INR goal 2.0-3.0   Today's INR 3.0   Full warfarin instructions 5 mg every day   Next INR check 7/19/2018    Indications   Long-term (current) use of anticoagulants [Z79.01] [Z79.01]  Atrial fibrillation with rapid ventricular response (H) (Resolved) [I48.91]         Your next Anticoagulation Clinic appointment(s)     Jun 07, 2018  3:15 PM CDT   Anticoagulation Visit with [facility]   [facility]    [address] [place] [address] [phone]              Contact Numbers     Clinic Number:         June 2018 Details    Sun Mon Tue Wed Thu Fri Sat          1               2                 3               4               5               6               7      5 mg   See details      8      5 mg         9      5 mg           10      5 mg         11      5 mg         12      5 mg         13      5 mg         14      5 mg         15      5 mg         16      5 mg           17      5 mg         18      5 mg         19      5 mg         20      5 mg         21      5 mg         22      5 mg         23      5 mg           24      5 mg         25      5 mg         26      5 mg         27      5 mg         28      5 mg         29      5 mg         30      5 mg          Date Details   06/07 This INR check               How to take your warfarin dose     To take:  5 mg Take 1 of the 5 mg tablets.           July 2018 Details    Sun Mon Tue Wed Thu Fri Sat     1      5 mg         2      5 mg         3      5 mg         4      5 mg         5      5 mg         6      5 mg         7      5 mg           8      5 mg         9      5 mg         10      5 mg         11      5 mg          12      5 mg         13      5 mg         14      5 mg           15      5 mg         16      5 mg         17      5 mg         18      5 mg         19            20               21                 22               23               24               25               26               27               28                 29               30               31                    Date Details   No additional details    Date of next INR:  7/19/2018         How to take your warfarin dose     To take:  5 mg Take 1 of the 5 mg tablets.

## 2018-06-07 NOTE — PROGRESS NOTES
HPI and Plan:   See dictation:[id number]    No orders of the defined types were placed in this encounter.      No orders of the defined types were placed in this encounter.      Medications Discontinued During This Encounter   Medication Reason     ASPIRIN NOT PRESCRIBED (INTENTIONAL) Medication Reconciliation Clean Up     torsemide (DEMADEX) 20 MG tablet Medication Reconciliation Clean Up     warfarin (COUMADIN) 5 MG tablet Medication Reconciliation Clean Up     WARFARIN SODIUM PO Medication Reconciliation Clean Up         No diagnosis found.    CURRENT MEDICATIONS:  Current Outpatient Prescriptions   Medication Sig Dispense Refill     acetaminophen 650 MG TABS Take 650 mg by mouth every 4 hours as needed for mild pain 100 tablet 0     ASPIRIN NOT PRESCRIBED (INTENTIONAL) Please choose reason not prescribed, below 0 each 0     lisinopril (PRINIVIL/ZESTRIL) 10 MG tablet Take 1 tablet (10 mg) by mouth daily 90 tablet 3     metoprolol tartrate (LOPRESSOR) 50 MG tablet TAKE THREE TABLETS BY MOUTH TWICE A  tablet 0     omeprazole (PRILOSEC) 20 MG CR capsule TAKE 1 CAPSULE BY MOUTH EVERY DAY 90 capsule 3     simvastatin (ZOCOR) 20 MG tablet Take 1 tablet (20 mg) by mouth At Bedtime 90 tablet 3     torsemide (DEMADEX) 20 MG tablet Take 1 tablet (20 mg) by mouth daily 90 tablet 3     warfarin (COUMADIN) 5 MG tablet TAKE ONE TABLET BY MOUTH EVERY DAY OR PER COAG CLINIC 30 tablet 1     [DISCONTINUED] torsemide (DEMADEX) 20 MG tablet Take 1 tablet (20 mg) by mouth daily 90 tablet 3     [DISCONTINUED] warfarin (COUMADIN) 5 MG tablet 5mg qd OR instructed by INR clinic 30 tablet 5     [DISCONTINUED] WARFARIN SODIUM PO Take 2.5 mg by mouth Every Wednesday and 5 mg ROW         ALLERGIES   No Known Allergies    PAST MEDICAL HISTORY:  Past Medical History:   Diagnosis Date     Atherosclerotic heart disease     cardiac cath 2016: minimal disease     Atrial fibrillation (H)      Cardiomyopathy, nonischemic (H)      Chronic systolic  congestive heart failure (H)      Diabetes mellitus      High cholesterol      Hypertension      Left ventricular diastolic dysfunction      Morbid obesity (H) 10/30/2015     Pulmonary edema 8/17/2015     Type 2 diabetes mellitus with diabetic chronic kidney disease (H) 8/17/2015       PAST SURGICAL HISTORY:  Past Surgical History:   Procedure Laterality Date     ANGIOGRAM  02/25/2016    Minimal coronary artery disease and no stenosis greater than 10% to at most 20%. Nonischemic cardiomyopathy, ejection fraction estimated to be 30%. Left ventricular end-diastolic pressure of 40 mmHg. No significant mitral regurgitation. Recommendations: Maximal medical management. Daily exercise and weight loss     COMBINED CYSTOSCOPY, RETROGRADES, URETEROSCOPY, INSERT STENT Left 5/13/2015    Procedure: COMBINED CYSTOSCOPY, RETROGRADES, URETEROSCOPY, INSERT STENT;  Surgeon: Jasen Medina MD;  Location:  OR       FAMILY HISTORY:  Family History   Problem Relation Age of Onset     CANCER Sister        SOCIAL HISTORY:  Social History     Social History     Marital status: Single     Spouse name: N/A     Number of children: N/A     Years of education: N/A     Social History Main Topics     Smoking status: Former Smoker     Quit date: 1/1/1993     Smokeless tobacco: Never Used     Alcohol use No     Drug use: No     Sexual activity: No     Other Topics Concern     Caffeine Concern No     occas. coke and, Iced Tea     Special Diet No     watches sodium     Exercise No     walking some      Social History Narrative       Review of Systems:  Skin:  Negative       Eyes:  Positive for glasses    ENT:  Negative      Respiratory:  Negative       Cardiovascular:  Negative      Gastroenterology: Negative      Genitourinary:  not assessed      Musculoskeletal:  Positive for back pain    Neurologic:  Negative      Psychiatric:  Negative      Heme/Lymph/Imm:  Negative      Endocrine:  Negative        Physical Exam:  Vitals: /72 (BP  "Location: Right arm, Patient Position: Sitting, Cuff Size: Adult Large)  Pulse 96  Ht 1.778 m (5' 10\")  Wt (!) 161.4 kg (355 lb 14.4 oz)  BMI 51.07 kg/m2    Constitutional:  well developed;in no acute distress;cooperative morbidly obese      Skin:  warm and dry to the touch     rosacia    Head:  normocephalic, no masses or lesions        Eyes:  sclera white;pupils equal and round;conjunctivae and lids unremarkable;no xanthalasma        Lymph:      ENT:  no pallor or cyanosis poor dentition      Neck:  carotid pulses are full and equal bilaterally, JVP normal, no carotid bruit        Respiratory:  normal breath sounds, clear to auscultation, normal A-P diameter, normal symmetry, normal respiratory excursion, no use of accessory muscles         Cardiac: normal S1 and S2;no S3 or S4 irregularly irregular rhythm distant heart sounds no presence of murmur          pulses full and equal                                        GI:  abdomen soft obese      Extremities and Muscular Skeletal:  no deformities, clubbing, cyanosis, erythema observed              Neurological:  no gross motor deficits;affect appropriate        Psych:  Alert and Oriented x 3        CC  No referring provider defined for this encounter.              "

## 2018-06-08 ENCOUNTER — OFFICE VISIT (OUTPATIENT)
Dept: FAMILY MEDICINE | Facility: CLINIC | Age: 58
End: 2018-06-08
Payer: COMMERCIAL

## 2018-06-08 VITALS
RESPIRATION RATE: 14 BRPM | OXYGEN SATURATION: 93 % | WEIGHT: 315 LBS | TEMPERATURE: 98 F | BODY MASS INDEX: 51.44 KG/M2 | DIASTOLIC BLOOD PRESSURE: 67 MMHG | HEART RATE: 108 BPM | SYSTOLIC BLOOD PRESSURE: 101 MMHG

## 2018-06-08 DIAGNOSIS — I48.20 CHRONIC ATRIAL FIBRILLATION (H): Primary | ICD-10-CM

## 2018-06-08 DIAGNOSIS — N18.30 TYPE 2 DIABETES MELLITUS WITH STAGE 3 CHRONIC KIDNEY DISEASE, WITHOUT LONG-TERM CURRENT USE OF INSULIN (H): ICD-10-CM

## 2018-06-08 DIAGNOSIS — I70.90 ASVD (ARTERIOSCLEROTIC VASCULAR DISEASE): ICD-10-CM

## 2018-06-08 DIAGNOSIS — E66.01 MORBID OBESITY (H): ICD-10-CM

## 2018-06-08 DIAGNOSIS — E78.5 HYPERLIPIDEMIA WITH TARGET LDL LESS THAN 100: ICD-10-CM

## 2018-06-08 DIAGNOSIS — I50.22 CHRONIC SYSTOLIC CONGESTIVE HEART FAILURE (H): ICD-10-CM

## 2018-06-08 DIAGNOSIS — E11.22 TYPE 2 DIABETES MELLITUS WITH STAGE 3 CHRONIC KIDNEY DISEASE, WITHOUT LONG-TERM CURRENT USE OF INSULIN (H): ICD-10-CM

## 2018-06-08 PROCEDURE — 99214 OFFICE O/P EST MOD 30 MIN: CPT | Performed by: FAMILY MEDICINE

## 2018-06-08 NOTE — PROGRESS NOTES
Service Date: 06/07/2018      PRIMARY CARE PHYSICIAN:  Dr. Fred Del Valle.      HISTORY OF PRESENT ILLNESS:  I had the pleasure of seeing your patient, Nikunj Anthony, at [facility] for evaluation of morbid obesity, permanent atrial fibrillation with at times rapid ventricular response and a nonischemic cardiomyopathy with ejection fraction of 35%.  This patient is normally followed by Dr. Fischer.  The patient was severely short of breath and sought medical attention on 05/27/2018 at [facility] in [place], [place].  He was felt to be in congestive heart failure.  Atrial fibrillation was noted with rapid ventricular response.  The patient suffers from morbid obesity and stage III chronic kidney disease.  His diet is poor and he readily offers the fact that he consumed excessive salty foods the day prior to his admission to the hospital.  He notes TV dinner and chicken patties.  He also felt as though he was developing a viral upper respiratory infection.  He denied any peripheral edema but noted increasing shortness of breath.  He has been a nonsmoker for 25 years.  In 2016 the patient was hospitalized for what was felt to be angina and a heart catheterization showed no evidence of significant coronary artery disease.  His LVEDP was markedly elevated at greater than 40 mmHg and he was started on torsemide.  The patient was offered implantation of a cardio-defibrillator which he rejected.  Additionally he has been unwilling to undergo a sleep study.  He sleeps in a recliner as he feels oxygen-starved when laying flat.  He denies any recent bleeding.  He remains on warfarin therapy with INRs being followed at [facility].      PHYSICAL EXAMINATION:  See physical exam below.      ASSESSMENT:   1.  Nikunj Anthony is a pleasant 57-year-old gentleman with a nonischemic cardiomyopathy and an ejection fraction previously of 35%.  This is likely due to  "multiple factors including morbid obesity, atrial fibrillation with at times rapid ventricular response.  I suggested to this patient that a 24-hour Holter monitor would be helpful to determine whether his atrial fibrillation rate is controlled.  The patient refuses this monitor.  Additionally he refuses to increase his metoprolol dose.  He believes \"this is enough metoprolol at this point\".  The patient's lungs are currently clear and I have not increased his torsemide dose.  His weight is now stable after a 10-pound weight loss.   2.  This patient has atrial fibrillation with at times rapid ventricular response.  He remains on warfarin.  His INR is being followed by his primary care physician.   3.  The patient is noncompliant with diet and exercise.  His eating habits are atrocious.  He is unlikely to make any changes in his eating habits based on my interview.   4.  This patient likely has obstructive sleep apnea but is unwilling to undergo any testing.      It is my intention to see this patient again on a p.r.n. basis.  He does have an appointment with Dr. Fischer .  Since the patient is noncompliant with diet and exercise but does take his medications but is unwilling to change the medication doses it will be very difficult to care for him in the future.  It is my intention to see him again on a p.r.n. basis and he will follow up with Dr. Fischer as scheduled.      Milena Hurt MD       cc:   [place] [address]         MILENA HURT MD, Merged with [facility]             D: 2018   T: 2018   MT: ANNMARIE      Name:     CT GRISSOM   MRN:      [id number]        Account:      [id number]   :      1960           Service Date: 2018      Document: [id number]    "

## 2018-06-08 NOTE — MR AVS SNAPSHOT
After Visit Summary   6/8/2018    Nikunj Anthony    MRN: [id number]           Patient Information     Date Of Birth          1960        Visit Information        Provider Department      6/8/2018 1:00 PM Fred Del Valle MD [facility]        Today's Diagnoses     Chronic atrial fibrillation (H)    -  1    Hyperlipidemia with target LDL less than 100        ASVD (arteriosclerotic vascular disease)        Type 2 diabetes mellitus with stage 3 chronic kidney disease, without long-term current use of insulin (H)        Morbid obesity (H)        Chronic systolic congestive heart failure (H)           Follow-ups after your visit        Follow-up notes from your care team     Return in about 6 months (around 12/8/2018) for Lab Work, Routine Visit.      Your next 10 appointments already scheduled     Aug 30, 2018  2:45 PM CDT   Return Visit with Wale Fischer MD   [facility]    [address]  [facility] [address] [phone]              Who to contact     If you have questions or need follow up information about today's clinic visit or your schedule please contact [facility] directly at [phone].  Normal or non-critical lab and imaging results will be communicated to you by MyChart, letter or phone within 4 business days after the clinic has received the results. If you do not hear from us within 7 days, please contact the clinic through MyChart or phone. If you have a critical or abnormal lab result, we will notify you by phone as soon as possible.  Submit refill requests through [employer] or call your pharmacy and they will forward the refill request to us. Please allow 3 business days for your refill to be completed.          Additional Information About Your Visit        Care EveryWhere ID     This is your Care EveryWhere ID. This could be used by other organizations  to access your [facility] medical records  [id number]        Your Vitals Were     Pulse Temperature Respirations Pulse Oximetry BMI (Body Mass Index)       108 98  F (36.7  C) (Oral) 14 93% 51.44 kg/m2        Blood Pressure from Last 3 Encounters:   06/08/18 101/67   06/07/18 100/72   04/10/18 114/71    Weight from Last 3 Encounters:   06/08/18 (!) 358 lb 8 oz (162.6 kg)   06/07/18 (!) 355 lb 14.4 oz (161.4 kg)   04/10/18 (!) 365 lb 11.2 oz (165.9 kg)              Today, you had the following     No orders found for display       Primary Care Provider Office Phone # Fax #    Fred Ignacio Del Valle [phone] [address] [address]        Goals        General    Medical (pt-stated)     Notes - Note created  2/29/2016  2:09 PM by Kelly Taylor RN    weigh self daily and write down weight     As of today's date 2/29/2016 goal is met at 0 - 25%.   Goal Status:  Active           Weight    Weight below 200 lb (91 kg)       Equal Access to Services     ANGELINA WARREN : Hadii arianne ku hadasho Sosindhuali, waaxda luqadaha, qaybta kaalmada adeegyada, eda sneed. So [facility] [phone].    ATENCIÓN: Si habla español, tiene a wynn disposición servicios gratuitos de asistencia lingüística. Gato al [phone].    We comply with applicable federal civil rights laws and [place] laws. We do not discriminate on the basis of race, color, national origin, age, disability, sex, sexual orientation, or gender identity.            Thank you!     Thank you for choosing [facility]  for your care. Our goal is always to provide you with excellent care. Hearing back from our patients is one way we can continue to improve our services. Please take a few minutes to complete the written survey that you may receive in the mail after your visit with us. Thank you!             Your Updated Medication List - Protect others around you: Learn how to safely use,  store and throw away your medicines at www.disposemymeds.org.          This list is accurate as of 6/8/18 11:59 PM.  Always use your most recent med list.                   Brand Name Dispense Instructions for use Diagnosis    acetaminophen 650 MG 8 hour tablet     100 tablet    Take 650 mg by mouth every 4 hours as needed for mild pain    Renal colic       ASPIRIN NOT PRESCRIBED    INTENTIONAL    0 each    Please choose reason not prescribed, below    ASVD (arteriosclerotic vascular disease), Chronic atrial fibrillation (H)       lisinopril 10 MG tablet    PRINIVIL/ZESTRIL    90 tablet    Take 1 tablet (10 mg) by mouth daily    Benign essential hypertension       metoprolol tartrate 50 MG tablet    LOPRESSOR    540 tablet    TAKE THREE TABLETS BY MOUTH TWICE A DAY    Acute on chronic diastolic congestive heart failure (H), Essential hypertension, benign       omeprazole 20 MG CR capsule    priLOSEC    90 capsule    TAKE 1 CAPSULE BY MOUTH EVERY DAY    Gastroesophageal reflux disease with esophagitis       simvastatin 20 MG tablet    ZOCOR    90 tablet    Take 1 tablet (20 mg) by mouth At Bedtime    Chronic atrial fibrillation (H)       torsemide 20 MG tablet    DEMADEX    90 tablet    Take 1 tablet (20 mg) by mouth daily    Acute on chronic diastolic congestive heart failure (H)       warfarin 5 MG tablet    COUMADIN    30 tablet    TAKE ONE TABLET BY MOUTH EVERY DAY OR PER COAG CLINIC    Long term current use of anticoagulant therapy

## 2018-06-08 NOTE — PROGRESS NOTES
SUBJECTIVE:   Nikunj Anthony is a 57 year old male who presents to clinic today for the following health issues:          Hospital Follow-up Visit:    Hospital/Nursing Home/IP Rehab Facility: [facility]  Date of Admission: 5/27/18  Date of Discharge: 5/28/18  Reason(s) for Admission: breathing problem            Problems taking medications regularly:  None       Medication changes since discharge: None       Problems adhering to non-medication therapy:  None    Summary of hospitalization:  [facility] discharge summary reviewed  Diagnostic Tests/Treatments reviewed.  Follow up needed: none  Other Healthcare Providers Involved in Patient s Care:Cardiology         Care Coordination  Update since discharge: improved.     Post Discharge Medication Reconciliation: discharge medications reconciled, continue medications without change.  Plan of care communicated with other healthcare provider     Coding guidelines for this visit:  Type of Medical   Decision Making Face-to-Face Visit       within 7 Days of discharge Face-to-Face Visit        within 14 days of discharge   Moderate Complexity [id number]   High Complexity [id number]            SUBJECTIVE:    CC: Nikunj Anthony is a 57 year old male who presents for chf and asvd, not interested in COR or any med changes to manage this Spurned Cardiology suggestions and makes no attempt to lose weight.  He's been dependent on family and assistance most of his adult life and has a peculiar afftect wrt his health.    HPI: no sob or chest pain that his endorses at this time         PROBLEM LIST:                   Patient Active Problem List   Diagnosis     Chronic atrial fibrillation (H)     Hyperlipidemia with target LDL less than 100     Acute kidney injury (H)     Advanced directives, counseling/discussion     ASVD (arteriosclerotic vascular disease)     Type 2 diabetes mellitus with diabetic chronic kidney disease (H)     Pulmonary edema     Essential  hypertension, benign     Morbid obesity (H)     Health Care Home     Chronic systolic congestive heart failure (H)     Left ventricular diastolic dysfunction     Cardiomyopathy, nonischemic (H)     Long-term (current) use of anticoagulants [Z79.01]     Gastroesophageal reflux disease with esophagitis and gastritis       PAST MEDICAL HISTORY:                  Past Medical History:   Diagnosis Date     Atherosclerotic heart disease     cardiac cath 2016: minimal disease     Atrial fibrillation (H)      Cardiomyopathy, nonischemic (H)      Chronic systolic congestive heart failure (H)      Diabetes mellitus      High cholesterol      Hypertension      Left ventricular diastolic dysfunction      Morbid obesity (H) 10/30/2015     Pulmonary edema 8/17/2015     Type 2 diabetes mellitus with diabetic chronic kidney disease (H) 8/17/2015       PAST SURGICAL HISTORY:                  Past Surgical History:   Procedure Laterality Date     ANGIOGRAM  02/25/2016    Minimal coronary artery disease and no stenosis greater than 10% to at most 20%. Nonischemic cardiomyopathy, ejection fraction estimated to be 30%. Left ventricular end-diastolic pressure of 40 mmHg. No significant mitral regurgitation. Recommendations: Maximal medical management. Daily exercise and weight loss     COMBINED CYSTOSCOPY, RETROGRADES, URETEROSCOPY, INSERT STENT Left 5/13/2015    Procedure: COMBINED CYSTOSCOPY, RETROGRADES, URETEROSCOPY, INSERT STENT;  Surgeon: Jasen Medina MD;  Location:  OR       CURRENT MEDICATIONS:                  Current Outpatient Prescriptions   Medication Sig Dispense Refill     acetaminophen 650 MG TABS Take 650 mg by mouth every 4 hours as needed for mild pain 100 tablet 0     ASPIRIN NOT PRESCRIBED (INTENTIONAL) Please choose reason not prescribed, below 0 each 0     lisinopril (PRINIVIL/ZESTRIL) 10 MG tablet Take 1 tablet (10 mg) by mouth daily 90 tablet 3     metoprolol tartrate (LOPRESSOR) 50 MG tablet TAKE  THREE TABLETS BY MOUTH TWICE A  tablet 0     omeprazole (PRILOSEC) 20 MG CR capsule TAKE 1 CAPSULE BY MOUTH EVERY DAY 90 capsule 3     simvastatin (ZOCOR) 20 MG tablet Take 1 tablet (20 mg) by mouth At Bedtime 90 tablet 3     torsemide (DEMADEX) 20 MG tablet Take 1 tablet (20 mg) by mouth daily 90 tablet 3     warfarin (COUMADIN) 5 MG tablet TAKE ONE TABLET BY MOUTH EVERY DAY OR PER [facility] 30 tablet 1             FAMILY HISTORY:                   Family History   Problem Relation Age of Onset     CANCER Sister        HEALTH MAINTENANCE:                    REVIEW OF OUTSIDE RECORDS: NO    REVIEW OF SYSTEMS:  CONSTITUTIONAL: NEGATIVE for fever, chills  INTEGUMENTARY/SKIN: NEGATIVE for worrisome rashes, moles or lesions  EYES: NEGATIVE for vision changes   ENT/MOUTH: NEGATIVE for ear, mouth and throat problems  RESP: NEGATIVE for significant cough or SOB  CV: NEGATIVE for chest pain, palpitations   GI: NEGATIVE for nausea, abdominal pain, heartburn, or change in bowel habits  : NEGATIVE for frequency, dysuria, or hematuria  MUSCULOSKELETAL: NEGATIVE for significant arthralgias or myalgia  NEURO: NEGATIVE for weakness, dizziness or paresthesias or headache  NVS:no headache or balance issus  INTEG:no moles or new rashes  LYMPH:no nodes or night sweats    EXAM:    /67 (BP Location: Left arm, Patient Position: Chair, Cuff Size: Adult Large)  Pulse 108  Temp 98  F (36.7  C) (Oral)  Resp 14  Wt (!) 358 lb 8 oz (162.6 kg)  SpO2 93%  BMI 51.44 kg/m2  GENERAL APPEARANCE: healthy, alert and no distress   EXAM:  GENERAL APPEARANCE: healthy, alert and no distress  EYES: EOMI,  PERRL  HENT: ear canals and TM's normal and nose and mouth without ulcers or lesions  NECK: no adenopathy, no asymmetry, masses, or scars and thyroid normal to palpation  RESP: lungs clear to auscultation - no rales, rhonchi or wheezes  CV: regular rates and rhythm, normal S1 S2, no S3 or S4 and no murmur, click or rub -  ABDOMEN:   soft, nontender, no HSM or masses and bowel sounds normal  BACK:no tenderness or pain on straight let raise           ASSESSMENT/PLAN    (I48.2) Chronic atrial fibrillation (H)  (primary encounter diagnosis)  Comment:   Plan: rate is controlled     (E78.5) Hyperlipidemia with target LDL less than 100  Comment:   Plan: takes his med    (I70.90) ASVD (arteriosclerotic vascular disease)  Comment:   Plan: no current chest pain issues     (E11.22,  N18.3) Type 2 diabetes mellitus with stage 3 chronic kidney disease, without long-term current use of insulin (H)  Comment:   Plan:     (E66.01) Morbid obesity (H)  Comment:   Plan: no inclination to lose weight     (I50.22) Chronic systolic congestive heart failure (H)  Comment:   Plan: spurned cardiology' med change suggestions won't even change the dose of his beta block                                  I have discussed with patient the risks, benefits, medications, treatment options and modalities.   I have instructed the patient to call or schedule a follow-up appointment if any problems or failure to improve.

## 2018-06-13 DIAGNOSIS — Z79.01 LONG TERM CURRENT USE OF ANTICOAGULANT THERAPY: ICD-10-CM

## 2018-06-13 NOTE — TELEPHONE ENCOUNTER
"Requested Prescriptions   Pending Prescriptions Disp Refills     warfarin (COUMADIN) 5 MG tablet [Pharmacy Med Name: WARFARIN SODIUM 5MG TABS]    Last Written Prescription Date: 4/12/18  Last Fill Quantity: 30 tablets,  # refills: 1   Last office visit: 6/8/2018 with prescribing provider:  Ivon   Future Office Visit:   Next 5 appointments (look out 90 days)     Aug 30, 2018  2:45 PM CDT   Return Visit with Wale Fischer MD   [facility]    [address]  [facility] [address] [phone]                  30 tablet 1     Sig: TAKE ONE TABLET BY MOUTH EVERY DAY OR PER COAG CLINIC    Vitamin K Antagonists Failed    6/13/2018 10:06 AM       Failed - INR is within goal in the past 6 weeks    Confirm INR is within goal in the past 6 weeks.     Recent Labs   Lab Test 06/07/18   INR  3.0*                      Passed - Recent (12 mo) or future (30 days) visit within the authorizing provider's specialty    Patient had office visit in the last 12 months or has a visit in the next 30 days with authorizing provider or within the authorizing provider's specialty.  See \"Patient Info\" tab in inbasket, or \"Choose Columns\" in Meds & Orders section of the refill encounter.           Passed - Patient is 18 years of age or older          "

## 2018-06-14 RX ORDER — WARFARIN SODIUM 5 MG/1
TABLET ORAL
Qty: 30 TABLET | Refills: 3 | Status: SHIPPED | OUTPATIENT
Start: 2018-06-14 | End: 2018-11-15

## 2018-06-14 NOTE — TELEPHONE ENCOUNTER
Last INR: 3.0 on 06/07/18  Next INR: Due 07/19/18    Prescription approved per [facility] Refill Protocol.    Leticia Antunez RN

## 2018-06-29 ENCOUNTER — TELEPHONE (OUTPATIENT)
Dept: FAMILY MEDICINE | Facility: CLINIC | Age: 58
End: 2018-06-29

## 2018-06-29 NOTE — TELEPHONE ENCOUNTER
Panel Management Review      Patient has the following on his problem list: None      Composite cancer screening  Chart review shows that this patient is due/due soon for the following Colonoscopy  Summary:    Patient is due/failing the following:   COLONOSCOPY    Action needed:   Patient needs referral/order: colonoscopy    Type of outreach:    panel pool    Questions for provider review:    None                                                                                                                                    Lacey Pate/GAYLA  [facility]---[facility]       Chart routed to Care Team .

## 2018-07-19 ENCOUNTER — ANTICOAGULATION THERAPY VISIT (OUTPATIENT)
Dept: NURSING | Facility: CLINIC | Age: 58
End: 2018-07-19
Payer: COMMERCIAL

## 2018-07-19 DIAGNOSIS — Z79.01 LONG-TERM (CURRENT) USE OF ANTICOAGULANTS: ICD-10-CM

## 2018-07-19 LAB — INR POINT OF CARE: 2.7 (ref 0.86–1.14)

## 2018-07-19 PROCEDURE — 36416 COLLJ CAPILLARY BLOOD SPEC: CPT

## 2018-07-19 PROCEDURE — 85610 PROTHROMBIN TIME: CPT | Mod: QW

## 2018-07-19 PROCEDURE — 99207 ZZC NO CHARGE NURSE ONLY: CPT

## 2018-07-19 NOTE — MR AVS SNAPSHOT
Nikunj Gabrielle   7/19/2018 2:45 PM   Anticoagulation Therapy Visit    Description:  57 year old male   Provider:  [facility]   Department:  Cr Nurse           INR as of 7/19/2018     Today's INR 2.7      Anticoagulation Summary as of 7/19/2018     INR goal 2.0-3.0   Today's INR 2.7   Full warfarin instructions 5 mg every day   Next INR check 8/30/2018    Indications   Long-term (current) use of anticoagulants [Z79.01] [Z79.01]  Atrial fibrillation with rapid ventricular response (H) (Resolved) [I48.91]         Your next Anticoagulation Clinic appointment(s)     Aug 30, 2018  4:15 PM CDT   Anticoagulation Visit with [facility]   [facility]    [address] [place] [address] [phone]              Contact Numbers     Clinic Number:         July 2018 Details    Sun Mon Tue Wed Thu Fri Sat     1               2               3               4               5               6               7                 8               9               10               11               12               13               14                 15               16               17               18               19      5 mg   See details      20      5 mg         21      5 mg           22      5 mg         23      5 mg         24      5 mg         25      5 mg         26      5 mg         27      5 mg         28      5 mg           29      5 mg         30      5 mg         31      5 mg              Date Details   07/19 This INR check               How to take your warfarin dose     To take:  5 mg Take 1 of the 5 mg tablets.           August 2018 Details    Sun Mon Tue Wed Thu Fri Sat        1      5 mg         2      5 mg         3      5 mg         4      5 mg           5      5 mg         6      5 mg         7      5 mg         8      5 mg         9      5 mg         10      5 mg         11      5 mg           12      5 mg          13      5 mg         14      5 mg         15      5 mg         16      5 mg         17      5 mg         18      5 mg           19      5 mg         20      5 mg         21      5 mg         22      5 mg         23      5 mg         24      5 mg         25      5 mg           26      5 mg         27      5 mg         28      5 mg         29      5 mg         30            31                 Date Details   No additional details    Date of next INR:  8/30/2018         How to take your warfarin dose     To take:  5 mg Take 1 of the 5 mg tablets.

## 2018-07-19 NOTE — PROGRESS NOTES
ANTICOAGULATION FOLLOW-UP CLINIC VISIT    Patient Name:  Nikunj Anthony  Date:  7/19/2018  Contact Type:  Face to Face    SUBJECTIVE:     Patient Findings     Positives Bruising (small bruise on L forearm from scratching skin)           OBJECTIVE    INR Protime   Date Value Ref Range Status   07/19/2018 2.7 (A) 0.86 - 1.14 Final       ASSESSMENT / PLAN  INR assessment THER    Recheck INR In: 6 WEEKS    INR Location Clinic      Anticoagulation Summary as of 7/19/2018     INR goal 2.0-3.0   Today's INR 2.7   Warfarin maintenance plan 5 mg (5 mg x 1) every day   Full warfarin instructions 5 mg every day   Weekly warfarin total 35 mg   No change documented Leticia Antunez RN   Plan last modified Leticia Antunez RN (12/23/2016)   Next INR check 8/30/2018   Priority INR   Target end date Indefinite    Indications   Long-term (current) use of anticoagulants [Z79.01] [Z79.01]  Atrial fibrillation with rapid ventricular response (H) (Resolved) [I48.91]         Anticoagulation Episode Summary     INR check location     Preferred lab     Send INR reminders to [facility]    Comments       Anticoagulation Care Providers     Provider Role Specialty Phone number    Fred Del Valle MD [facility] [phone]            See the Encounter Report to view Anticoagulation Flowsheet and Dosing Calendar (Go to Encounters tab in chart review, and find the Anticoagulation Therapy Visit)        Leticia Antunez, RN

## 2018-08-07 DIAGNOSIS — I50.33 ACUTE ON CHRONIC DIASTOLIC CONGESTIVE HEART FAILURE (H): ICD-10-CM

## 2018-08-07 DIAGNOSIS — I10 ESSENTIAL HYPERTENSION, BENIGN: ICD-10-CM

## 2018-08-07 NOTE — TELEPHONE ENCOUNTER
"Last Written Prescription Date:  4/10/18  Last Fill Quantity: 540 tablet,  # refills: 0   Last office visit: 6/8/2018 with prescribing provider:  Ivon   Future Office Visit:   Next 5 appointments (look out 90 days)     Aug 30, 2018  2:45 PM CDT   Return Visit with Wale Fischer MD   [facility] ([facility])    [address] [address] [address] [phone]                 Requested Prescriptions   Pending Prescriptions Disp Refills     metoprolol tartrate (LOPRESSOR) 50 MG tablet 540 tablet 0     Sig: TAKE THREE TABLETS BY MOUTH TWICE A DAY    Beta-Blockers Protocol Passed    8/7/2018 11:10 AM       Passed - Blood pressure under 140/90 in past 12 months    BP Readings from Last 3 Encounters:   06/08/18 101/67   06/07/18 100/72   04/10/18 114/71                Passed - Patient is age 6 or older       Passed - Recent (12 mo) or future (30 days) visit within the authorizing provider's specialty    Patient had office visit in the last 12 months or has a visit in the next 30 days with authorizing provider or within the authorizing provider's specialty.  See \"Patient Info\" tab in inbasket, or \"Choose Columns\" in Meds & Orders section of the refill encounter.              "

## 2018-08-08 RX ORDER — METOPROLOL TARTRATE 50 MG
TABLET ORAL
Qty: 540 TABLET | Refills: 1 | Status: SHIPPED | OUTPATIENT
Start: 2018-08-08 | End: 2019-03-14

## 2018-08-08 NOTE — TELEPHONE ENCOUNTER
Prescription approved per [facility] Refill Protocol.  Mallika Sharif RN, BSN

## 2018-08-30 ENCOUNTER — OFFICE VISIT (OUTPATIENT)
Dept: CARDIOLOGY | Facility: CLINIC | Age: 58
End: 2018-08-30
Attending: INTERNAL MEDICINE
Payer: COMMERCIAL

## 2018-08-30 ENCOUNTER — ANTICOAGULATION THERAPY VISIT (OUTPATIENT)
Dept: NURSING | Facility: CLINIC | Age: 58
End: 2018-08-30
Payer: COMMERCIAL

## 2018-08-30 VITALS
BODY MASS INDEX: 45.1 KG/M2 | SYSTOLIC BLOOD PRESSURE: 90 MMHG | HEART RATE: 84 BPM | HEIGHT: 70 IN | WEIGHT: 315 LBS | DIASTOLIC BLOOD PRESSURE: 60 MMHG

## 2018-08-30 DIAGNOSIS — I42.8 CARDIOMYOPATHY, NONISCHEMIC (H): ICD-10-CM

## 2018-08-30 DIAGNOSIS — I48.20 CHRONIC ATRIAL FIBRILLATION (H): ICD-10-CM

## 2018-08-30 DIAGNOSIS — Z79.01 LONG-TERM (CURRENT) USE OF ANTICOAGULANTS: ICD-10-CM

## 2018-08-30 LAB — INR POINT OF CARE: 3.5 (ref 0.86–1.14)

## 2018-08-30 PROCEDURE — 99207 ZZC NO CHARGE NURSE ONLY: CPT

## 2018-08-30 PROCEDURE — 85610 PROTHROMBIN TIME: CPT | Mod: QW

## 2018-08-30 PROCEDURE — 36416 COLLJ CAPILLARY BLOOD SPEC: CPT

## 2018-08-30 PROCEDURE — 99214 OFFICE O/P EST MOD 30 MIN: CPT | Performed by: INTERNAL MEDICINE

## 2018-08-30 NOTE — MR AVS SNAPSHOT
"              After Visit Summary   8/30/2018    Nikunj Anthony    MRN: [id number]           Patient Information     Date Of Birth          1960        Visit Information        Provider Department      8/30/2018 2:45 PM Wale Fischer MD [facility]        Today's Diagnoses     Cardiomyopathy, nonischemic (H)        Chronic atrial fibrillation (H)           Follow-ups after your visit        Your next 10 appointments already scheduled     Aug 30, 2018  4:15 PM CDT   Anticoagulation Visit with [facility]TICO[facility]ATION CLINIC   [facility] ([facility])    [address] [place] [address] [phone]              Who to contact     If you have questions or need follow up information about today's clinic visit or your schedule please contact [facility] directly at [phone].  Normal or non-critical lab and imaging results will be communicated to you by MyChart, letter or phone within 4 business days after the clinic has received the results. If you do not hear from us within 7 days, please contact the clinic through MyChart or phone. If you have a critical or abnormal lab result, we will notify you by phone as soon as possible.  Submit refill requests through [employer] or call your pharmacy and they will forward the refill request to us. Please allow 3 business days for your refill to be completed.          Additional Information About Your Visit        Care EveryWhere ID     This is your Care EveryWhere ID. This could be used by other organizations to access your [place] medical records  [id number]        Your Vitals Were     Pulse Height BMI (Body Mass Index)             84 1.778 m (5' 10\") 51.87 kg/m2          Blood Pressure from Last 3 Encounters:   08/30/18 90/60   06/08/18 101/67   06/07/18 100/72    Weight from Last 3 Encounters:   08/30/18 (!) 164 kg (361 lb 8 " oz)   06/08/18 (!) 162.6 kg (358 lb 8 oz)   06/07/18 (!) 161.4 kg (355 lb 14.4 oz)              We Performed the Following     Follow-Up with Cardiologist        Primary Care Provider Office Phone # Fax #    Fred Ignacio Del Valle [phone] [address] [facility] [address]        Goals        General    Medical (pt-stated)     Notes - Note created  2/29/2016  2:09 PM by Kelly Tyalor, RN    weigh self daily and write down weight     As of today's date 2/29/2016 goal is met at 0 - 25%.   Goal Status:  Active           Weight    Weight below 91 kg (200 lb)       Equal Access to Services     [facility] : Hadii arianne lopez hadasho [place]jo[place], waaxda luqadaha, qaybta kaalmada adeegyada, eda hernandez . So [facility] [phone].    ATENCIÓN: Si habla español, tiene a wynn disposición servicios gratuitos de asistencia lingüística. Llame al [phone].    We comply with applicable federal civil rights laws and [place] laws. We do not discriminate on the basis of race, color, national origin, age, disability, sex, sexual orientation, or gender identity.            Thank you!     Thank you for choosing [facility]  for your care. Our goal is always to provide you with excellent care. Hearing back from our patients is one way we can continue to improve our services. Please take a few minutes to complete the written survey that you may receive in the mail after your visit with us. Thank you!             Your Updated Medication List - Protect others around you: Learn how to safely use, store and throw away your medicines at www.disposemymeds.org.          This list is accurate as of 8/30/18  3:10 PM.  Always use your most recent med list.                   Brand Name Dispense Instructions for use Diagnosis    acetaminophen 650 MG 8 hour tablet     100 tablet    Take 650 mg by mouth every 4 hours as needed for mild pain    Renal  colic       ASPIRIN NOT PRESCRIBED    INTENTIONAL    0 each    Please choose reason not prescribed, below    ASVD (arteriosclerotic vascular disease), Chronic atrial fibrillation (H)       lisinopril 10 MG tablet    PRINIVIL/ZESTRIL    90 tablet    Take 1 tablet (10 mg) by mouth daily    Benign essential hypertension       metoprolol tartrate 50 MG tablet    LOPRESSOR    540 tablet    TAKE THREE TABLETS BY MOUTH TWICE A DAY    Acute on chronic diastolic congestive heart failure (H), Essential hypertension, benign       omeprazole 20 MG CR capsule    priLOSEC    90 capsule    TAKE 1 CAPSULE BY MOUTH EVERY DAY    Gastroesophageal reflux disease with esophagitis       simvastatin 20 MG tablet    ZOCOR    90 tablet    Take 1 tablet (20 mg) by mouth At Bedtime    Chronic atrial fibrillation (H)       torsemide 20 MG tablet    DEMADEX    90 tablet    Take 1 tablet (20 mg) by mouth daily    Acute on chronic diastolic congestive heart failure (H)       warfarin 5 MG tablet    COUMADIN    30 tablet    TAKE ONE TABLET BY MOUTH EVERY DAY OR PER COAG CLINIC    Long term current use of anticoagulant therapy

## 2018-08-30 NOTE — MR AVS SNAPSHOT
Nikunj Gabrielle   8/30/2018 4:15 PM   Anticoagulation Therapy Visit    Description:  57 year old male   Provider:  [facility]   Department:  Cr Nurse           INR as of 8/30/2018     Today's INR 3.5!      Anticoagulation Summary as of 8/30/2018     INR goal 2.0-3.0   Today's INR 3.5!   Full warfarin instructions 8/30: 2.5 mg; Otherwise 5 mg every day   Next INR check 9/13/2018    Indications   Long-term (current) use of anticoagulants [Z79.01] [Z79.01]  Atrial fibrillation with rapid ventricular response (H) (Resolved) [I48.91]         Your next Anticoagulation Clinic appointment(s)     Aug 30, 2018  4:15 PM CDT   Anticoagulation Visit with  ANTICOAGULATION CLINIC   [facility] ([facility])    [address] [place] [address]   [phone]              Contact Numbers     Clinic Number:         August 2018 Details    Sun Mon Tue Wed Thu Fri Sat        1               2               3               4                 5               6               7               8               9               10               11                 12               13               14               15               16               17               18                 19               20               21               22               23               24               25                 26               27               28               29               30      2.5 mg   See details      31      5 mg           Date Details   08/30 This INR check               How to take your warfarin dose     To take:  2.5 mg Take 0.5 of a 5 mg tablet.    To take:  5 mg Take 1 of the 5 mg tablets.           September 2018 Details    Sun Mon Tue Wed Thu Fri Sat           1      5 mg           2      5 mg         3      5 mg         4      5 mg         5      5 mg         6      5 mg         7      5 mg         8      5 mg           9      5 mg         10      5 mg          11      5 mg         12      5 mg         13            14               15                 16               17               18               19               20               21               22                 23               24               25               26               27               28               29                 30                      Date Details   No additional details    Date of next INR:  9/13/2018         How to take your warfarin dose     To take:  5 mg Take 1 of the 5 mg tablets.

## 2018-08-30 NOTE — LETTER
8/30/2018    Fred Del Valle [place]  [address] [facility] [address]    RE: Nikunj Anthony       Dear Colleague,    I had the pleasure of seeing Nikunj Gabrielle in the [facility].    HPI and Plan:   See dictation    No orders of the defined types were placed in this encounter.      No orders of the defined types were placed in this encounter.      There are no discontinued medications.      Encounter Diagnoses   Name Primary?     Cardiomyopathy, nonischemic (H)      Chronic atrial fibrillation (H)        CURRENT MEDICATIONS:  Current Outpatient Prescriptions   Medication Sig Dispense Refill     acetaminophen 650 MG TABS Take 650 mg by mouth every 4 hours as needed for mild pain 100 tablet 0     ASPIRIN NOT PRESCRIBED (INTENTIONAL) Please choose reason not prescribed, below 0 each 0     lisinopril (PRINIVIL/ZESTRIL) 10 MG tablet Take 1 tablet (10 mg) by mouth daily 90 tablet 3     metoprolol tartrate (LOPRESSOR) 50 MG tablet TAKE THREE TABLETS BY MOUTH TWICE A  tablet 1     omeprazole (PRILOSEC) 20 MG CR capsule TAKE 1 CAPSULE BY MOUTH EVERY DAY 90 capsule 3     simvastatin (ZOCOR) 20 MG tablet Take 1 tablet (20 mg) by mouth At Bedtime 90 tablet 3     torsemide (DEMADEX) 20 MG tablet Take 1 tablet (20 mg) by mouth daily 90 tablet 3     warfarin (COUMADIN) 5 MG tablet TAKE ONE TABLET BY MOUTH EVERY DAY OR PER COAG CLINIC 30 tablet 3       ALLERGIES   No Known Allergies    PAST MEDICAL HISTORY:  Past Medical History:   Diagnosis Date     Atherosclerotic heart disease     cardiac cath 2016: minimal disease     Atrial fibrillation (H)      Cardiomyopathy, nonischemic (H)      Chronic systolic congestive heart failure (H)      Diabetes mellitus      High cholesterol      Hypertension      Left ventricular diastolic dysfunction      Morbid obesity (H) 10/30/2015     Pulmonary edema 8/17/2015     Type 2 diabetes mellitus with diabetic chronic kidney disease (H) 8/17/2015  "      PAST SURGICAL HISTORY:  Past Surgical History:   Procedure Laterality Date     ANGIOGRAM  02/25/2016    Minimal coronary artery disease and no stenosis greater than 10% to at most 20%. Nonischemic cardiomyopathy, ejection fraction estimated to be 30%. Left ventricular end-diastolic pressure of 40 mmHg. No significant mitral regurgitation. Recommendations: Maximal medical management. Daily exercise and weight loss     COMBINED CYSTOSCOPY, RETROGRADES, URETEROSCOPY, INSERT STENT Left 5/13/2015    Procedure: COMBINED CYSTOSCOPY, RETROGRADES, URETEROSCOPY, INSERT STENT;  Surgeon: Jasen Medina MD;  Location:  OR       FAMILY HISTORY:  Family History   Problem Relation Age of Onset     Cancer Sister        SOCIAL HISTORY:  Social History     Social History     Marital status: Single     Spouse name: N/A     Number of children: N/A     Years of education: N/A     Social History Main Topics     Smoking status: Former Smoker     Quit date: 1/1/1993     Smokeless tobacco: Never Used     Alcohol use No     Drug use: No     Sexual activity: No     Other Topics Concern     Caffeine Concern No     occas. coke and, Iced Tea     Special Diet No     watches sodium     Exercise No     walking some      Social History Narrative       Review of Systems:  Skin:  Negative       Eyes:  Positive for glasses    ENT:  Negative      Respiratory:  Negative       Cardiovascular:  Negative      Gastroenterology: Positive for reflux    Genitourinary:  Negative      Musculoskeletal:  Positive for back pain    Neurologic:  Negative      Psychiatric:  Negative      Heme/Lymph/Imm:  Negative      Endocrine:  Negative        Physical Exam:  Vitals: BP 90/60 (BP Location: Right arm, Patient Position: Sitting, Cuff Size: Adult Large)  Pulse 84  Ht 1.778 m (5' 10\")  Wt (!) 164 kg (361 lb 8 oz)  BMI 51.87 kg/m2    Constitutional:  well developed;in no acute distress;cooperative morbidly obese      Skin:  warm and dry to the touch     " rosacia    Head:  normocephalic        Eyes:  sclera white        Lymph:      ENT:  no pallor or cyanosis        Neck:  no stiffness        Respiratory:  normal breath sounds, clear to auscultation, normal A-P diameter, normal symmetry, normal respiratory excursion, no use of accessory muscles         Cardiac: normal S1 and S2;no S3 or S4 irregularly irregular rhythm distant heart sounds no presence of murmur systolic murmur        pulses full and equal                                        GI:  abdomen soft obese      Extremities and Muscular Skeletal:  no deformities, clubbing, cyanosis, erythema observed   2+ 1+;RLE edema 1+;LLE edema      Neurological:  affect appropriate        Psych:  Alert and Oriented x 3        CC  Wale iFscher MD  [address]  [place] [address]                Thank you for allowing me to participate in the care of your patient.      Sincerely,     Wale Fischer MD     [facility]    cc:   Wale Fischer MD  [address]  [place] [address]

## 2018-08-30 NOTE — PROGRESS NOTES
ANTICOAGULATION FOLLOW-UP CLINIC VISIT    Patient Name:  Nikunj Anthony  Date:  8/30/2018  Contact Type:  Face to Face    SUBJECTIVE:     Patient Findings     Positives Unexplained INR or factor level change    Comments I suggested patient come back for next INR in 2 weeks; however, he states he lives too far away so he will probably come back in 1 month.  Patient states he will not be eating greens today or tonight so I decreased his Warfarin dose for today only.  Patient states he will call for his next INR appointment.           OBJECTIVE    INR Protime   Date Value Ref Range Status   08/30/2018 3.5 (A) 0.86 - 1.14 Final       ASSESSMENT / PLAN  INR assessment SUPRA    Recheck INR In: 2 WEEKS    INR Location Clinic      Anticoagulation Summary as of 8/30/2018     INR goal 2.0-3.0   Today's INR 3.5!   Warfarin maintenance plan 5 mg (5 mg x 1) every day   Full warfarin instructions 8/30: 2.5 mg; Otherwise 5 mg every day   Weekly warfarin total 35 mg   Plan last modified Leticia Antunez RN (12/23/2016)   Next INR check 9/13/2018   Priority INR   Target end date Indefinite    Indications   Long-term (current) use of anticoagulants [Z79.01] [Z79.01]  Atrial fibrillation with rapid ventricular response (H) (Resolved) [I48.91]         Anticoagulation Episode Summary     INR check location     Preferred lab     Send INR reminders to [facility]    Comments       Anticoagulation Care Providers     Provider Role Specialty Phone number    Fred Del Valle MD [facility] [phone]            See the Encounter Report to view Anticoagulation Flowsheet and Dosing Calendar (Go to Encounters tab in chart review, and find the Anticoagulation Therapy Visit)        Leticia Antunez RN

## 2018-08-30 NOTE — PROGRESS NOTES
Service Date: 08/30/2018      HISTORY OF PRESENT ILLNESS:  Mr. Anthony is a 57-year-old gentleman with a history of morbid obesity, permanent atrial fibrillation, ischemic cardiomyopathy with an ejection fraction of 35% who has refused a defibrillator on multiple occasions who returns in annual followup.      The patient was seen by Dr. Mcdowell in June of this year following a presentation for acute decompensated heart failure to an outside institution.  At that time, the patient had been eating a high sodium diet.  Dr. Mcdowell discussed diet with the patient who did not appear interested in changing his diet.  He also continues to refuse to undergo a sleep study.      The patient has had a cardiac catheterization showing no evidence of coronary disease in the past.  He had a markedly elevated LVEDP and has been on torsemide since.  This summer was his most recent hospitalization in some time for heart failure.      At today's visit, the patient states that he has been stable.  His weight is 361 pounds, up slightly from 358 pounds when we saw him last.  He states that he has been taking his cardiac medications as prescribed.  He has stable dyspnea with exertion, but denies any shortness of breath at rest, orthopnea or paroxysmal nocturnal dyspnea.  However, the patient does sleep in a recliner, so it is difficult to determine if he is truly not having orthopnea.      Please see my separate note with his full physical examination.      IMPRESSION AND PLAN:  Mr. Anthony is a pleasant 57-year-old gentleman with a nonischemic cardiomyopathy with an ejection fraction of 35% with a recent admission for acute decompensated heart failure due to dietary noncompliance.  Fortunately, he has been compliant with his cardiac medications.  At this point, I would continue his cardiac regimen unchanged.  He continues to refuse a defibrillator or a sleep study to evaluate for obstructive sleep apnea.  The patient sees   David once a year in the primary care clinic.  At this time, I believe that it would be reasonable for the patient to follow up with Cardiology on a p.r.n. basis.  I have instructed the patient to remain on his medications unchanged until he sees Dr. Del Valle.         MILA MEYERS MD             D: 2018   T: 2018   MT: WESTLEY      Name:     CT GRISSOM   MRN:      [id number]        Account:      [id number]   :      1960           Service Date: 2018      Document: [id number]

## 2018-08-30 NOTE — PROGRESS NOTES
HPI and Plan:   See dictation    No orders of the defined types were placed in this encounter.      No orders of the defined types were placed in this encounter.      There are no discontinued medications.      Encounter Diagnoses   Name Primary?     Cardiomyopathy, nonischemic (H)      Chronic atrial fibrillation (H)        CURRENT MEDICATIONS:  Current Outpatient Prescriptions   Medication Sig Dispense Refill     acetaminophen 650 MG TABS Take 650 mg by mouth every 4 hours as needed for mild pain 100 tablet 0     ASPIRIN NOT PRESCRIBED (INTENTIONAL) Please choose reason not prescribed, below 0 each 0     lisinopril (PRINIVIL/ZESTRIL) 10 MG tablet Take 1 tablet (10 mg) by mouth daily 90 tablet 3     metoprolol tartrate (LOPRESSOR) 50 MG tablet TAKE THREE TABLETS BY MOUTH TWICE A  tablet 1     omeprazole (PRILOSEC) 20 MG CR capsule TAKE 1 CAPSULE BY MOUTH EVERY DAY 90 capsule 3     simvastatin (ZOCOR) 20 MG tablet Take 1 tablet (20 mg) by mouth At Bedtime 90 tablet 3     torsemide (DEMADEX) 20 MG tablet Take 1 tablet (20 mg) by mouth daily 90 tablet 3     warfarin (COUMADIN) 5 MG tablet TAKE ONE TABLET BY MOUTH EVERY DAY OR PER COAG CLINIC 30 tablet 3       ALLERGIES   No Known Allergies    PAST MEDICAL HISTORY:  Past Medical History:   Diagnosis Date     Atherosclerotic heart disease     cardiac cath 2016: minimal disease     Atrial fibrillation (H)      Cardiomyopathy, nonischemic (H)      Chronic systolic congestive heart failure (H)      Diabetes mellitus      High cholesterol      Hypertension      Left ventricular diastolic dysfunction      Morbid obesity (H) 10/30/2015     Pulmonary edema 8/17/2015     Type 2 diabetes mellitus with diabetic chronic kidney disease (H) 8/17/2015       PAST SURGICAL HISTORY:  Past Surgical History:   Procedure Laterality Date     ANGIOGRAM  02/25/2016    Minimal coronary artery disease and no stenosis greater than 10% to at most 20%. Nonischemic cardiomyopathy, ejection  "fraction estimated to be 30%. Left ventricular end-diastolic pressure of 40 mmHg. No significant mitral regurgitation. Recommendations: Maximal medical management. Daily exercise and weight loss     COMBINED CYSTOSCOPY, RETROGRADES, URETEROSCOPY, INSERT STENT Left 5/13/2015    Procedure: COMBINED CYSTOSCOPY, RETROGRADES, URETEROSCOPY, INSERT STENT;  Surgeon: Jasen Medina MD;  Location:  OR       FAMILY HISTORY:  Family History   Problem Relation Age of Onset     Cancer Sister        SOCIAL HISTORY:  Social History     Social History     Marital status: Single     Spouse name: N/A     Number of children: N/A     Years of education: N/A     Social History Main Topics     Smoking status: Former Smoker     Quit date: 1/1/1993     Smokeless tobacco: Never Used     Alcohol use No     Drug use: No     Sexual activity: No     Other Topics Concern     Caffeine Concern No     occas. coke and, Iced Tea     Special Diet No     watches sodium     Exercise No     walking some      Social History Narrative       Review of Systems:  Skin:  Negative       Eyes:  Positive for glasses    ENT:  Negative      Respiratory:  Negative       Cardiovascular:  Negative      Gastroenterology: Positive for reflux    Genitourinary:  Negative      Musculoskeletal:  Positive for back pain    Neurologic:  Negative      Psychiatric:  Negative      Heme/Lymph/Imm:  Negative      Endocrine:  Negative        Physical Exam:  Vitals: BP 90/60 (BP Location: Right arm, Patient Position: Sitting, Cuff Size: Adult Large)  Pulse 84  Ht 1.778 m (5' 10\")  Wt (!) 164 kg (361 lb 8 oz)  BMI 51.87 kg/m2    Constitutional:  well developed;in no acute distress;cooperative morbidly obese      Skin:  warm and dry to the touch     rosacia    Head:  normocephalic        Eyes:  sclera white        Lymph:      ENT:  no pallor or cyanosis        Neck:  no stiffness        Respiratory:  normal breath sounds, clear to auscultation, normal A-P diameter, normal " symmetry, normal respiratory excursion, no use of accessory muscles         Cardiac: normal S1 and S2;no S3 or S4 irregularly irregular rhythm distant heart sounds no presence of murmur systolic murmur        pulses full and equal                                        GI:  abdomen soft obese      Extremities and Muscular Skeletal:  no deformities, clubbing, cyanosis, erythema observed   2+ 1+;RLE edema 1+;LLE edema      Neurological:  affect appropriate        Psych:  Alert and Oriented x 3        CC  Wale Fischer MD  [address]  [place] [id number]

## 2018-09-20 ENCOUNTER — ANTICOAGULATION THERAPY VISIT (OUTPATIENT)
Dept: NURSING | Facility: CLINIC | Age: 58
End: 2018-09-20
Payer: COMMERCIAL

## 2018-09-20 DIAGNOSIS — Z79.01 LONG-TERM (CURRENT) USE OF ANTICOAGULANTS: ICD-10-CM

## 2018-09-20 LAB — INR POINT OF CARE: 2.7 (ref 0.86–1.14)

## 2018-09-20 PROCEDURE — 99207 ZZC NO CHARGE NURSE ONLY: CPT

## 2018-09-20 PROCEDURE — 85610 PROTHROMBIN TIME: CPT | Mod: QW

## 2018-09-20 PROCEDURE — 36416 COLLJ CAPILLARY BLOOD SPEC: CPT

## 2018-09-20 NOTE — MR AVS SNAPSHOT
Nikunj Gabrielle   9/20/2018 3:15 PM   Anticoagulation Therapy Visit    Description:  57 year old male   Provider:  [facility]   Department:  [facility]           INR as of 9/20/2018     Today's INR 2.7      Anticoagulation Summary as of 9/20/2018     INR goal 2.0-3.0   Today's INR 2.7   Full warfarin instructions 5 mg every day   Next INR check 11/1/2018    Indications   Long-term (current) use of anticoagulants [Z79.01] [Z79.01]  Atrial fibrillation with rapid ventricular response (H) (Resolved) [I48.91]         Your next Anticoagulation Clinic appointment(s)     Sep 20, 2018  3:15 PM CDT   [facility]    [address] [place] [address] [phone]              Contact Numbers     Clinic Number:         September 2018 Details    Sun Mon Tue Wed Thu Fri Sat           1                 2               3               4               5               6               7               8                 9               10               11               12               13               14               15                 16               17               18               19               20      5 mg   See details      21      5 mg         22      5 mg           23      5 mg         24      5 mg         25      5 mg         26      5 mg         27      5 mg         28      5 mg         29      5 mg           30      5 mg                Date Details   09/20 This INR check               How to take your warfarin dose     To take:  5 mg Take 1 of the 5 mg tablets.           October 2018 Details    Sun Mon Tue Wed Thu Fri Sat      1      5 mg         2      5 mg         3      5 mg         4      5 mg         5      5 mg         6      5 mg           7      5 mg         8      5 mg         9      5 mg         10      5 mg         11      5 mg         12      5 mg         13      5  mg           14      5 mg         15      5 mg         16      5 mg         17      5 mg         18      5 mg         19      5 mg         20      5 mg           21      5 mg         22      5 mg         23      5 mg         24      5 mg         25      5 mg         26      5 mg         27      5 mg           28      5 mg         29      5 mg         30      5 mg         31      5 mg             Date Details   No additional details            How to take your warfarin dose     To take:  5 mg Take 1 of the 5 mg tablets.           November 2018 Details    Sun Mon Tue Wed Thu Fri Sat         1            2               3                 4               5               6               7               8               9               10                 11               12               13               14               15               16               17                 18               19               20               21               22               23               24                 25               26               27               28               29               30                 Date Details   No additional details    Date of next INR:  11/1/2018         How to take your warfarin dose     To take:  5 mg Take 1 of the 5 mg tablets.

## 2018-09-20 NOTE — PROGRESS NOTES
ANTICOAGULATION FOLLOW-UP CLINIC VISIT    Patient Name:  Nikunj Anthony  Date:  9/20/2018  Contact Type:  Face to Face    SUBJECTIVE:     Patient Findings     Positives No Problem Findings           OBJECTIVE    INR Protime   Date Value Ref Range Status   09/20/2018 2.7 (A) 0.86 - 1.14 Final       ASSESSMENT / PLAN  INR assessment THER    Recheck INR In: 6 WEEKS    INR Location Clinic      Anticoagulation Summary as of 9/20/2018     INR goal 2.0-3.0   Today's INR 2.7   Warfarin maintenance plan 5 mg (5 mg x 1) every day   Full warfarin instructions 5 mg every day   Weekly warfarin total 35 mg   No change documented Leticia Antunez, KODY   Plan last modified Leticia Antunez RN (12/23/2016)   Next INR check 11/1/2018   Priority INR   Target end date Indefinite    Indications   Long-term (current) use of anticoagulants [Z79.01] [Z79.01]  Atrial fibrillation with rapid ventricular response (H) (Resolved) [I48.91]         Anticoagulation Episode Summary     INR check location     Preferred lab     Send INR reminders to [facility]    Comments       Anticoagulation Care Providers     Provider Role Specialty Phone number    Fred Del Valle MD [facility] [phone]            See the Encounter Report to view Anticoagulation Flowsheet and Dosing Calendar (Go to Encounters tab in chart review, and find the Anticoagulation Therapy Visit)        Leticia Antunez RN

## 2018-09-25 ENCOUNTER — TELEPHONE (OUTPATIENT)
Dept: FAMILY MEDICINE | Facility: CLINIC | Age: 58
End: 2018-09-25

## 2018-09-25 NOTE — TELEPHONE ENCOUNTER
PA SEVERE hph reflux with esophageal stricture , past endoscopy, longterm therapy needed, annual PA needed,  17, routed to [facility],    Pharmacy Benefit Information:    Provider: KEVIN  Phone #: [phone]  ID#: [id number]  Medication/SIG: Omeprazole 20 mg     [facility] - [address]    Mallika Sharif RN, BSN  Message handled by Nurse Triage.

## 2018-09-26 NOTE — TELEPHONE ENCOUNTER
Central Prior Authorization Team   Phone: [phone]    PA Initiation    Medication: omeprazole  Insurance Company: [employer] [place] - Phone [phone] Fax [phone]  Pharmacy Filling the Rx: [facility] [place] [place] - [address] [place] [address]  Filling Pharmacy Phone: [phone]  Filling Pharmacy Fax:    Start Date: 9/26/2018

## 2018-09-26 NOTE — TELEPHONE ENCOUNTER
Prior Authorization Approval    Authorization Effective Date: 9/24/2018  Authorization Expiration Date: 9/24/2019  Medication: omeprazole  Approved Dose/Quantity:    Reference #:     Insurance Company: [employer] [place] - Phone [phone] Fax [phone]  Expected CoPay:       CoPay Card Available:      Foundation Assistance Needed:    Which Pharmacy is filling the prescription (Not needed for infusion/clinic administered): [facility] - [address]  Pharmacy Notified: Yes  Patient Notified: Yes

## 2018-11-05 ENCOUNTER — TELEPHONE (OUTPATIENT)
Dept: FAMILY MEDICINE | Facility: CLINIC | Age: 58
End: 2018-11-05

## 2018-11-05 NOTE — TELEPHONE ENCOUNTER
Panel Management Review      Patient has the following on his problem list:     Diabetes    ASA: Not Required     Last A1C  Lab Results   Component Value Date    A1C 5.7 04/10/2018    A1C 5.4 03/31/2017    A1C 5.8 02/19/2016    A1C 6.7 08/18/2015    A1C 5.9 05/13/2015     A1C tested: Passed    Last LDL:    Lab Results   Component Value Date    CHOL 121 04/10/2018     Lab Results   Component Value Date    HDL 42 04/10/2018     Lab Results   Component Value Date    LDL 59 04/10/2018     Lab Results   Component Value Date    TRIG 100 04/10/2018     Lab Results   Component Value Date    CHOLHDLRATIO 2.3 12/18/2014     Lab Results   Component Value Date    NHDL 79 04/10/2018       Is the patient on a Statin? YES             Is the patient on Aspirin? NO    Medications     HMG CoA Reductase Inhibitors    simvastatin (ZOCOR) 20 MG tablet          Last three blood pressure readings:  BP Readings from Last 3 Encounters:   08/30/18 90/60   06/08/18 101/67   06/07/18 100/72       Date of last diabetes office visit: 4/10/2018     Tobacco History:     History   Smoking Status     Former Smoker     Quit date: 1/1/1993   Smokeless Tobacco     Never Used           Composite cancer screening  Chart review shows that this patient is due/due soon for the following Colonoscopy  Summary:    Patient is due/failing the following:   A1C and COLONOSCOPY    Action needed:   Patient needs office visit for diabetes. and Patient needs referral/order: colonoscopy    Type of outreach:    routed to panel pool for outreach    Questions for provider review:    None                                                                                                                                    Noy Larios       Chart routed to Care Team .

## 2018-11-05 NOTE — TELEPHONE ENCOUNTER
Type of outreach:  Phone, spoke to patient.  Patient declined to schedule a diabetic recheck with Dr Del Valle or have an order placed for a colonoscopy.    Marilee Rojas MA on 11/5/2018 at 12:10 PM

## 2018-11-15 DIAGNOSIS — Z79.01 LONG TERM CURRENT USE OF ANTICOAGULANT THERAPY: ICD-10-CM

## 2018-11-15 NOTE — TELEPHONE ENCOUNTER
"Requested Prescriptions   Pending Prescriptions Disp Refills     warfarin (COUMADIN) 5 MG tablet    Last Written Prescription Date:  6/14/18  Last Fill Quantity: 30 tablet,  # refills: 3   Last office visit: 6/8/2018 with prescribing provider:  Ivon   Future Office Visit:     30 tablet 3    Vitamin K Antagonists Failed    11/15/2018  2:25 PM       Failed - INR is within goal in the past 6 weeks    Confirm INR is within goal in the past 6 weeks.     Recent Labs   Lab Test 09/20/18   INR  2.7*                      Passed - Recent (12 mo) or future (30 days) visit within the authorizing provider's specialty    Patient had office visit in the last 12 months or has a visit in the next 30 days with authorizing provider or within the authorizing provider's specialty.  See \"Patient Info\" tab in inbasket, or \"Choose Columns\" in Meds & Orders section of the refill encounter.             Passed - Patient is 18 years of age or older          "

## 2018-11-16 ENCOUNTER — ANTICOAGULATION THERAPY VISIT (OUTPATIENT)
Dept: NURSING | Facility: CLINIC | Age: 58
End: 2018-11-16
Payer: COMMERCIAL

## 2018-11-16 LAB — INR PPP: 2.2

## 2018-11-16 PROCEDURE — 99207 ZZC NO CHARGE NURSE ONLY: CPT

## 2018-11-16 RX ORDER — WARFARIN SODIUM 5 MG/1
TABLET ORAL
Qty: 30 TABLET | Refills: 3 | Status: SHIPPED | OUTPATIENT
Start: 2018-11-16 | End: 2019-03-11

## 2018-11-16 NOTE — TELEPHONE ENCOUNTER
Last INR: 2.7 on 09/20/18  Next INR: Due 11/30/18    I spoke to patient, he had his INR checked at  in [place] on 10/19/18, it was 2.2 per patient report.  He informed me that he may not make it back to [facility] until beginning of December, 2018.    Prescription approved per [facility] Refill Protocol.    Leticia Antunez RN

## 2018-11-16 NOTE — MR AVS SNAPSHOT
Nikunj Baberg   11/16/2018 1:45 PM   Anticoagulation Therapy Visit    Description:  57 year old male   Provider:  [facility]   Department:  [facility] Nurse           INR as of 11/16/2018     Today's INR 2.2 (10/19/2018)      Anticoagulation Summary as of 11/16/2018     INR goal 2.0-3.0   Today's INR 2.2 (10/19/2018)   Full warfarin instructions 5 mg every day   Next INR check 12/6/2018    Indications   Long-term (current) use of anticoagulants [Z79.01] [Z79.01]  Atrial fibrillation with rapid ventricular response (H) (Resolved) [I48.91]         Contact Numbers     Clinic Number:         November 2018 Details    Sun Mon Tue Wed Thu Fri Sat         1               2               3                 4               5               6               7               8               9               10                 11               12               13               14               15               16      5 mg   See details      17      5 mg           18      5 mg         19      5 mg         20      5 mg         21      5 mg         22      5 mg         23      5 mg         24      5 mg           25      5 mg         26      5 mg         27      5 mg         28      5 mg         29      5 mg         30      5 mg           Date Details   11/16 This INR check               How to take your warfarin dose     To take:  5 mg Take 1 of the 5 mg tablets.           December 2018 Details    Sun Mon Tue Wed Thu Fri Sat           1      5 mg           2      5 mg         3      5 mg         4      5 mg         5      5 mg         6            7               8                 9               10               11               12               13               14               15                 16               17               18               19               20               21               22                 23               24               25               26               27               28               29                  30               31                     Date Details   No additional details    Date of next INR:  12/6/2018         How to take your warfarin dose     To take:  5 mg Take 1 of the 5 mg tablets.

## 2018-12-04 ENCOUNTER — TELEPHONE (OUTPATIENT)
Dept: NURSING | Facility: CLINIC | Age: 58
End: 2018-12-04

## 2018-12-04 DIAGNOSIS — I48.20 CHRONIC ATRIAL FIBRILLATION (H): Primary | ICD-10-CM

## 2018-12-04 NOTE — TELEPHONE ENCOUNTER
Has the patient previously taken warfarin? yes  If yes, for what indication? Atrial Fibrillation    Does the patient have any of the following indications for a higher range of 2.5-3.5:    Mitral position mechanical valve? no    Rashi-Shiley, Ball and Cage or Monoleaflet valve (regardless of position) no    Other (if yes, please explain) no      New insurance and medicare reimbursement rules require that all of our INR patients have an INR Clinic referral order in Epic, and that it be renewed annually.     We have entered this initial order for you and your signature is required once you review it.       You may close this encounter once signed.     Thank you,     Leticia Antunez RN

## 2018-12-20 ENCOUNTER — ALLIED HEALTH/NURSE VISIT (OUTPATIENT)
Dept: NURSING | Facility: CLINIC | Age: 58
End: 2018-12-20
Payer: COMMERCIAL

## 2018-12-20 ENCOUNTER — ANTICOAGULATION THERAPY VISIT (OUTPATIENT)
Dept: NURSING | Facility: CLINIC | Age: 58
End: 2018-12-20
Payer: COMMERCIAL

## 2018-12-20 DIAGNOSIS — Z23 NEED FOR PROPHYLACTIC VACCINATION AND INOCULATION AGAINST INFLUENZA: Primary | ICD-10-CM

## 2018-12-20 LAB — INR POINT OF CARE: 2.7 (ref 0.86–1.14)

## 2018-12-20 PROCEDURE — 85610 PROTHROMBIN TIME: CPT | Mod: QW

## 2018-12-20 PROCEDURE — 90471 IMMUNIZATION ADMIN: CPT

## 2018-12-20 PROCEDURE — 36416 COLLJ CAPILLARY BLOOD SPEC: CPT

## 2018-12-20 PROCEDURE — 90682 RIV4 VACC RECOMBINANT DNA IM: CPT

## 2018-12-20 PROCEDURE — 99207 ZZC NO CHARGE NURSE ONLY: CPT

## 2018-12-20 NOTE — PROGRESS NOTES
ANTICOAGULATION FOLLOW-UP CLINIC VISIT    Patient Name:  Nikunj Anthony  Date:  2018  Contact Type:  Face to Face    SUBJECTIVE:     Patient Findings     Positives:   No Problem Findings           OBJECTIVE    INR Protime   Date Value Ref Range Status   2018 2.7 (A) 0.86 - 1.14 Final       ASSESSMENT / PLAN  INR assessment THER    Recheck INR In: 6 WEEKS    INR Location Clinic      Anticoagulation Summary  As of 2018    INR goal:   2.0-3.0   TTR:   87.5 % (2.7 y)   INR used for dosin.7 (2018)   Warfarin maintenance plan:   5 mg (5 mg x 1) every day   Full warfarin instructions:   5 mg every day   Weekly warfarin total:   35 mg   No change documented:   Leticia Antunez RN   Plan last modified:   Leticia Antunez RN (2016)   Next INR check:      Priority:   INR   Target end date:   Indefinite    Indications    Long-term (current) use of anticoagulants [Z79.01] [Z79.01]  Atrial fibrillation with rapid ventricular response (H) (Resolved) [I48.91]             Anticoagulation Episode Summary     INR check location:       Preferred lab:       Send INR reminders to:   [facility]    Comments:         Anticoagulation Care Providers     Provider Role Specialty Phone number    Fred Del Valle MD [facility] [phone]            See the Encounter Report to view Anticoagulation Flowsheet and Dosing Calendar (Go to Encounters tab in chart review, and find the Anticoagulation Therapy Visit)        Leticia Antunez RN

## 2019-02-28 ENCOUNTER — TELEPHONE (OUTPATIENT)
Dept: FAMILY MEDICINE | Facility: CLINIC | Age: 59
End: 2019-02-28

## 2019-02-28 NOTE — TELEPHONE ENCOUNTER
LMOM to inform patient his INR is greatly overdue.  I requested patient call back to schedule INR appointment asap.  Patient does live an hour or 2 from the [facility] so it may be that he needs to establish with a clinic that is closer to him.    Leticia Antunez RN

## 2019-03-07 NOTE — TELEPHONE ENCOUNTER
Patient left message on VM in INR clinic stating he hopes to get a ride to [place] soon, weather dependent.   Patient stated he will call back later to schedule an appointment.    Leticia Antunez RN

## 2019-03-11 DIAGNOSIS — I48.20 CHRONIC ATRIAL FIBRILLATION (H): ICD-10-CM

## 2019-03-11 DIAGNOSIS — Z79.01 LONG TERM CURRENT USE OF ANTICOAGULANTS WITH INR GOAL OF 2.0-3.0: Primary | ICD-10-CM

## 2019-03-11 DIAGNOSIS — Z79.01 LONG TERM CURRENT USE OF ANTICOAGULANT THERAPY: ICD-10-CM

## 2019-03-11 RX ORDER — WARFARIN SODIUM 5 MG/1
TABLET ORAL
Qty: 30 TABLET | Refills: 1 | Status: SHIPPED | OUTPATIENT
Start: 2019-03-11 | End: 2019-04-16

## 2019-03-11 NOTE — TELEPHONE ENCOUNTER
Last INR: 11/16/18  Next INR: Patient called INR clinic today, 03/11/19, he scheduled INR appt. For 03/19/19    Prescription approved per [facility] Refill Protocol.    Leticia Antunez RN

## 2019-03-11 NOTE — TELEPHONE ENCOUNTER
Patient called INR clinic on 03/11/19, he scheduled an INR appointment for 03/19/19.    Leticia Antunez RN

## 2019-03-14 DIAGNOSIS — I50.33 ACUTE ON CHRONIC DIASTOLIC CONGESTIVE HEART FAILURE (H): ICD-10-CM

## 2019-03-14 DIAGNOSIS — I10 ESSENTIAL HYPERTENSION, BENIGN: ICD-10-CM

## 2019-03-14 RX ORDER — METOPROLOL TARTRATE 50 MG
TABLET ORAL
Qty: 180 TABLET | Refills: 0 | Status: SHIPPED | OUTPATIENT
Start: 2019-03-14 | End: 2019-04-10

## 2019-03-14 NOTE — TELEPHONE ENCOUNTER
Last Written Prescription Date:  ***  Last Fill Quantity: ***,  # refills: ***   Last office visit: 6/8/2018 with prescribing provider:  ***   Future Office Visit:      Requested Prescriptions   Pending Prescriptions Disp Refills     metoprolol tartrate (LOPRESSOR) 50 MG tablet 540 tablet 1     Sig: TAKE THREE TABLETS BY MOUTH TWICE A DAY    There is no refill protocol information for this order

## 2019-03-14 NOTE — LETTER
[address] [address]  Phone: [phone]    03/14/19    Nikunj Anthony  [address] [place] [address]      Dear Nikunj:     We recently received a call from your pharmacy requesting a refill of your medication - metoprolol tartrate (LOPRESSOR) 50 MG tablet.    A review of your chart indicates that an appointment was required with your provider for office visit in December. Please call the clinic at [phone] to schedule your appointment.    We have authorized one refill of your medication to allow time for you to schedule your appointment.    Taking care of your health is important to us, and ongoing visits with your provider are vital to your care.  We look forward to seeing you in the near future.          Sincerely,      Fred Del Valle MD/Lo BRITO RN

## 2019-03-14 NOTE — TELEPHONE ENCOUNTER
"Last Written Prescription Date:  8.8.18  Last Fill Quantity: 540,  # refills: 1   Last office visit: 6/8/2018 with prescribing provider:  Ivon   Future Office Visit:      Requested Prescriptions   Pending Prescriptions Disp Refills     metoprolol tartrate (LOPRESSOR) 50 MG tablet 540 tablet 1     Sig: TAKE THREE TABLETS BY MOUTH TWICE A DAY    Beta-Blockers Protocol Passed - 3/14/2019 12:09 PM       Passed - Blood pressure under 140/90 in past 12 months    BP Readings from Last 3 Encounters:   08/30/18 90/60   06/08/18 101/67   06/07/18 100/72                Passed - Patient is age 6 or older       Passed - Recent (12 mo) or future (30 days) visit within the authorizing provider's specialty    Patient had office visit in the last 12 months or has a visit in the next 30 days with authorizing provider or within the authorizing provider's specialty.  See \"Patient Info\" tab in inbasket, or \"Choose Columns\" in Meds & Orders section of the refill encounter.             Passed - Medication is active on med list        Medication is being filled for 1 time refill only due to:  Patient needs to be seen because due for visit Dec 2018, letter sent to make appt.     Lo Goldstein RN, BS  Clinical Nurse Triage.      "

## 2019-03-19 ENCOUNTER — ANTICOAGULATION THERAPY VISIT (OUTPATIENT)
Dept: NURSING | Facility: CLINIC | Age: 59
End: 2019-03-19
Payer: COMMERCIAL

## 2019-03-19 LAB — INR POINT OF CARE: 2.3 (ref 0.86–1.14)

## 2019-03-19 PROCEDURE — 85610 PROTHROMBIN TIME: CPT | Mod: QW

## 2019-03-19 PROCEDURE — 36416 COLLJ CAPILLARY BLOOD SPEC: CPT

## 2019-03-19 NOTE — PROGRESS NOTES
ANTICOAGULATION FOLLOW-UP CLINIC VISIT    Patient Name:  Nikunj Anthony  Date:  3/19/2019  Contact Type:  Face to Face    SUBJECTIVE:     Patient Findings     Comments:   The patient was assessed for diet, medication, and activity level changes, missed or extra doses, bruising or bleeding, with no problem findings.  Patient states he now depends on his sister to bring him to  from [place].  I strongly encouraged patient to find a clinic closer to his home as it is important to be seen routinely for INR checks. Patient states he will call me later to schedule his next INR appointment.             OBJECTIVE    INR Protime   Date Value Ref Range Status   2019 2.3 (A) 0.86 - 1.14 Final       ASSESSMENT / PLAN  INR assessment THER    Recheck INR In: 6 WEEKS    INR Location Clinic      Anticoagulation Summary  As of 3/19/2019    INR goal:   2.0-3.0   TTR:   88.6 % (3 y)   INR used for dosin.3 (3/19/2019)   Warfarin maintenance plan:   5 mg (5 mg x 1) every day   Full warfarin instructions:   5 mg every day   Weekly warfarin total:   35 mg   No change documented:   Leticia Antunez RN   Plan last modified:   Leticia Antunez RN (2016)   Next INR check:   2019   Priority:   INR   Target end date:   Indefinite    Indications    Long-term (current) use of anticoagulants [Z79.01] [Z79.01]  Atrial fibrillation with rapid ventricular response (H) (Resolved) [I48.91]             Anticoagulation Episode Summary     INR check location:       Preferred lab:       Send INR reminders to:   [facility]    Comments:         Anticoagulation Care Providers     Provider Role Specialty Phone number    Fred Del Valle MD [facility] [phone]            See the Encounter Report to view Anticoagulation Flowsheet and Dosing Calendar (Go to Encounters tab in chart review, and find the Anticoagulation Therapy Visit)        Leticia Antunez RN

## 2019-04-09 DIAGNOSIS — I50.33 ACUTE ON CHRONIC DIASTOLIC CONGESTIVE HEART FAILURE (H): ICD-10-CM

## 2019-04-09 DIAGNOSIS — I10 ESSENTIAL HYPERTENSION, BENIGN: ICD-10-CM

## 2019-04-09 NOTE — TELEPHONE ENCOUNTER
"Last Written Prescription Date:  3/14/19  Last Fill Quantity: 180 tablet,  # refills: 0   Last office visit: 6/8/2018 with prescribing provider:  RIDGE Del Valle   Future Office Visit:      Requested Prescriptions   Pending Prescriptions Disp Refills     metoprolol tartrate (LOPRESSOR) 50 MG tablet 180 tablet 0     Sig: TAKE THREE TABLETS BY MOUTH TWICE A DAY       Beta-Blockers Protocol Passed - 4/9/2019 10:18 AM        Passed - Blood pressure under 140/90 in past 12 months     BP Readings from Last 3 Encounters:   08/30/18 90/60   06/08/18 101/67   06/07/18 100/72                 Passed - Patient is age 6 or older        Passed - Recent (12 mo) or future (30 days) visit within the authorizing provider's specialty     Patient had office visit in the last 12 months or has a visit in the next 30 days with authorizing provider or within the authorizing provider's specialty.  See \"Patient Info\" tab in inbasket, or \"Choose Columns\" in Meds & Orders section of the refill encounter.              Passed - Medication is active on med list          "

## 2019-04-10 RX ORDER — METOPROLOL TARTRATE 50 MG
TABLET ORAL
Qty: 180 TABLET | Refills: 0 | Status: SHIPPED | OUTPATIENT
Start: 2019-04-10 | End: 2019-04-16

## 2019-04-10 NOTE — TELEPHONE ENCOUNTER
Return in about 6 months (around 12/8/2018) for Lab Work, Routine Visit.   Spoke with patient and he is currently living in [place] and depends on people for rides.  He is also coming due for an INR as well so suggested that he schedule them both on the same day when he can get a ride.  Since he is living in [place] and depends on people for rides it was suggested he est care with a new provider closer to home but he would like to stick with PCP for now.      Routing refill request to provider for review/approval because:  Jayne given x1 and patient did not follow up, please advise

## 2019-04-16 ENCOUNTER — OFFICE VISIT (OUTPATIENT)
Dept: FAMILY MEDICINE | Facility: CLINIC | Age: 59
End: 2019-04-16
Payer: COMMERCIAL

## 2019-04-16 ENCOUNTER — ANTICOAGULATION THERAPY VISIT (OUTPATIENT)
Dept: NURSING | Facility: CLINIC | Age: 59
End: 2019-04-16
Payer: COMMERCIAL

## 2019-04-16 VITALS
OXYGEN SATURATION: 96 % | RESPIRATION RATE: 16 BRPM | BODY MASS INDEX: 51.54 KG/M2 | HEART RATE: 110 BPM | TEMPERATURE: 97.9 F | DIASTOLIC BLOOD PRESSURE: 78 MMHG | SYSTOLIC BLOOD PRESSURE: 113 MMHG | WEIGHT: 315 LBS

## 2019-04-16 DIAGNOSIS — E11.22 TYPE 2 DIABETES MELLITUS WITH STAGE 3 CHRONIC KIDNEY DISEASE, WITHOUT LONG-TERM CURRENT USE OF INSULIN (H): Primary | ICD-10-CM

## 2019-04-16 DIAGNOSIS — I50.33 ACUTE ON CHRONIC DIASTOLIC CONGESTIVE HEART FAILURE (H): ICD-10-CM

## 2019-04-16 DIAGNOSIS — I42.8 CARDIOMYOPATHY, NONISCHEMIC (H): ICD-10-CM

## 2019-04-16 DIAGNOSIS — I70.90 ASVD (ARTERIOSCLEROTIC VASCULAR DISEASE): ICD-10-CM

## 2019-04-16 DIAGNOSIS — N18.30 TYPE 2 DIABETES MELLITUS WITH STAGE 3 CHRONIC KIDNEY DISEASE, WITHOUT LONG-TERM CURRENT USE OF INSULIN (H): Primary | ICD-10-CM

## 2019-04-16 DIAGNOSIS — K21.00 GASTROESOPHAGEAL REFLUX DISEASE WITH ESOPHAGITIS: ICD-10-CM

## 2019-04-16 DIAGNOSIS — R73.9 HYPERGLYCEMIA: ICD-10-CM

## 2019-04-16 DIAGNOSIS — I10 BENIGN ESSENTIAL HYPERTENSION: ICD-10-CM

## 2019-04-16 DIAGNOSIS — I10 ESSENTIAL HYPERTENSION, BENIGN: ICD-10-CM

## 2019-04-16 DIAGNOSIS — I48.20 CHRONIC ATRIAL FIBRILLATION (H): ICD-10-CM

## 2019-04-16 DIAGNOSIS — Z79.01 LONG TERM CURRENT USE OF ANTICOAGULANTS WITH INR GOAL OF 2.0-3.0: ICD-10-CM

## 2019-04-16 DIAGNOSIS — E66.01 MORBID OBESITY (H): ICD-10-CM

## 2019-04-16 LAB
HBA1C MFR BLD: 5.7 % (ref 0–5.6)
INR POINT OF CARE: 2.5 (ref 0.86–1.14)

## 2019-04-16 PROCEDURE — 84443 ASSAY THYROID STIM HORMONE: CPT | Performed by: FAMILY MEDICINE

## 2019-04-16 PROCEDURE — 99207 ZZC NO CHARGE NURSE ONLY: CPT

## 2019-04-16 PROCEDURE — 82043 UR ALBUMIN QUANTITATIVE: CPT | Performed by: FAMILY MEDICINE

## 2019-04-16 PROCEDURE — 85610 PROTHROMBIN TIME: CPT | Mod: QW

## 2019-04-16 PROCEDURE — 99214 OFFICE O/P EST MOD 30 MIN: CPT | Performed by: FAMILY MEDICINE

## 2019-04-16 PROCEDURE — 36416 COLLJ CAPILLARY BLOOD SPEC: CPT

## 2019-04-16 PROCEDURE — 83036 HEMOGLOBIN GLYCOSYLATED A1C: CPT | Performed by: FAMILY MEDICINE

## 2019-04-16 PROCEDURE — 83721 ASSAY OF BLOOD LIPOPROTEIN: CPT | Performed by: FAMILY MEDICINE

## 2019-04-16 PROCEDURE — 80053 COMPREHEN METABOLIC PANEL: CPT | Performed by: FAMILY MEDICINE

## 2019-04-16 RX ORDER — LISINOPRIL 10 MG/1
10 TABLET ORAL DAILY
Qty: 90 TABLET | Refills: 3 | Status: SHIPPED | OUTPATIENT
Start: 2019-04-16 | End: 2020-05-12

## 2019-04-16 RX ORDER — WARFARIN SODIUM 5 MG/1
TABLET ORAL
Qty: 30 TABLET | Refills: 1 | Status: SHIPPED | OUTPATIENT
Start: 2019-04-16 | End: 2019-07-08

## 2019-04-16 RX ORDER — SIMVASTATIN 20 MG
20 TABLET ORAL AT BEDTIME
Qty: 90 TABLET | Refills: 3 | Status: SHIPPED | OUTPATIENT
Start: 2019-04-16 | End: 2020-05-12

## 2019-04-16 RX ORDER — TORSEMIDE 20 MG/1
20 TABLET ORAL DAILY
Qty: 90 TABLET | Refills: 3 | Status: SHIPPED | OUTPATIENT
Start: 2019-04-16

## 2019-04-16 RX ORDER — METOPROLOL TARTRATE 50 MG
TABLET ORAL
Qty: 180 TABLET | Refills: 0 | Status: SHIPPED | OUTPATIENT
Start: 2019-04-16 | End: 2019-06-12

## 2019-04-16 ASSESSMENT — ENCOUNTER SYMPTOMS
MYALGIAS: 0
DYSURIA: 0
NERVOUS/ANXIOUS: 0
HEMATURIA: 0
COUGH: 0
HEARTBURN: 0
SORE THROAT: 0
SHORTNESS OF BREATH: 1
CONSTIPATION: 0
HEADACHES: 0
CHILLS: 0
PALPITATIONS: 0
JOINT SWELLING: 0
ARTHRALGIAS: 1
FREQUENCY: 1
PARESTHESIAS: 1
ABDOMINAL PAIN: 0
DIARRHEA: 0
NAUSEA: 0
WEAKNESS: 1
FEVER: 0
DIZZINESS: 0
EYE PAIN: 0
HEMATOCHEZIA: 0

## 2019-04-16 NOTE — LETTER
April 22, 2019      Nikunj Anthony  [address] [place] [address]        Dear ,    We are writing to inform you of your test results.    Your blood tests were pretty good except for the Prediabetes (A1C) test that shows that you still need to try to eat less carbohydrates to stay healthy and take weight off to unburden your heart. Less potato, pasta and sugar will help you get there.     Resulted Orders   Comprehensive metabolic panel   Result Value Ref Range    Sodium 141 133 - 144 mmol/L    Potassium 4.2 3.4 - 5.3 mmol/L    Chloride 110 (H) 94 - 109 mmol/L    Carbon Dioxide 24 20 - 32 mmol/L    Anion Gap 7 3 - 14 mmol/L    Glucose 107 (H) 70 - 99 mg/dL    Urea Nitrogen 29 7 - 30 mg/dL    Creatinine 1.40 (H) 0.66 - 1.25 mg/dL    GFR Estimate 55 (L) >60 mL/min/[1.73_m2]      Comment:      Non  GFR Calc  Starting 12/18/2018, serum creatinine based estimated GFR (eGFR) will be   calculated using the Chronic Kidney Disease Epidemiology Collaboration   (CKD-EPI) equation.      GFR Estimate If Black 64 >60 mL/min/[1.73_m2]      Comment:       GFR Calc  Starting 12/18/2018, serum creatinine based estimated GFR (eGFR) will be   calculated using the Chronic Kidney Disease Epidemiology Collaboration   (CKD-EPI) equation.      Calcium 9.0 8.5 - 10.1 mg/dL    Bilirubin Total 0.8 0.2 - 1.3 mg/dL    Albumin 3.7 3.4 - 5.0 g/dL    Protein Total 7.8 6.8 - 8.8 g/dL    Alkaline Phosphatase 75 40 - 150 U/L    ALT 34 0 - 70 U/L    AST 35 0 - 45 U/L   Hemoglobin A1c   Result Value Ref Range    Hemoglobin A1C 5.7 (H) 0 - 5.6 %      Comment:      Normal <5.7% Prediabetes 5.7-6.4%  Diabetes 6.5% or higher - adopted from ADA   consensus guidelines.     LDL cholesterol direct   Result Value Ref Range    LDL Cholesterol Direct 68 <100 mg/dL      Comment:      Desirable:       <100 mg/dl   TSH with free T4 reflex   Result Value Ref Range    TSH 3.67 0.40 - 4.00 mU/L   Albumin Random  Urine Quantitative with Creat Ratio   Result Value Ref Range    Creatinine Urine 190 mg/dL    Albumin Urine mg/L 126 mg/L    Albumin Urine mg/g Cr 66.32 (H) 0 - 17 mg/g Cr       If you have any questions or concerns, please call the clinic at the number listed above.       Sincerely,        Fred Del Valle MD

## 2019-04-16 NOTE — PROGRESS NOTES
ANTICOAGULATION FOLLOW-UP CLINIC VISIT    Patient Name:  Nikunj Anthony  Date:  2019  Contact Type:  Face to Face    SUBJECTIVE:     Patient Findings     Comments:   The patient was assessed for diet, medication, and activity level changes, missed or extra doses, bruising or bleeding, with no problem findings.    Patient saw PCP today for medication check, no changes were made.           OBJECTIVE    INR Protime   Date Value Ref Range Status   2019 2.5 (A) 0.86 - 1.14 Final       ASSESSMENT / PLAN  INR assessment THER    Recheck INR In: 6 WEEKS    INR Location Clinic      Anticoagulation Summary  As of 2019    INR goal:   2.0-3.0   TTR:   88.9 % (3 y)   INR used for dosin.5 (2019)   Warfarin maintenance plan:   5 mg (5 mg x 1) every day   Full warfarin instructions:   5 mg every day   Weekly warfarin total:   35 mg   No change documented:   Leticia Antunez RN   Plan last modified:   Leticia Antunez RN (2016)   Next INR check:   2019   Priority:   INR   Target end date:   Indefinite    Indications    Long-term (current) use of anticoagulants [Z79.01] [Z79.01]  Atrial fibrillation with rapid ventricular response (H) (Resolved) [I48.91]             Anticoagulation Episode Summary     INR check location:       Preferred lab:       Send INR reminders to:   [facility]    Comments:         Anticoagulation Care Providers     Provider Role Specialty Phone number    Fred Del Valle MD [facility] [phone]            See the Encounter Report to view Anticoagulation Flowsheet and Dosing Calendar (Go to Encounters tab in chart review, and find the Anticoagulation Therapy Visit)        Leticia Antunez RN

## 2019-04-16 NOTE — PROGRESS NOTES
Answers for HPI/ROS submitted by the patient on 4/16/2019   Annual Exam:  Frequency of exercise:: 1 day/week  Getting at least 3 servings of Calcium per day:: Yes  Diet:: Regular (no restrictions), Low salt  Taking medications regularly:: Yes  Medication side effects:: None  Bi-annual eye exam:: NO  Dental care twice a year:: NO  Sleep apnea or symptoms of sleep apnea:: None  abdominal pain: No  Blood in stool: No  Blood in urine: No  chest pain: No  chills: No  congestion: No  constipation: No  cough: No  diarrhea: No  dizziness: No  ear pain: No  eye pain: No  nervous/anxious: No  fever: No  frequency: Yes  genital sores: No  headaches: No  hearing loss: Yes  heartburn: No  arthralgias: Yes  joint swelling: No  peripheral edema: Yes  mood changes: Yes  myalgias: No  nausea: No  dysuria: No  palpitations: No  Skin sensation changes: Yes  sore throat: No  urgency: Yes  rash: No  shortness of breath: Yes  visual disturbance: Yes  weakness: Yes  impotence: Yes  penile discharge: Yes  Additional concerns today:: Yes  Duration of exercise:: 15-30 minutes    SUBJECTIVE:   Nikunj Anthony is a 58 year old male who presents to clinic today for the following   health issues:      Patient is here for a follow up. He lives in [facility]         Additional history: as documented    Reviewed  and updated as needed this visit by clinical staff         Reviewed and updated as needed this visit by Provider         Current Outpatient Medications   Medication Sig Dispense Refill     acetaminophen 650 MG TABS Take 650 mg by mouth every 4 hours as needed for mild pain 100 tablet 0     ASPIRIN NOT PRESCRIBED (INTENTIONAL) Please choose reason not prescribed, below 0 each 0     lisinopril (PRINIVIL/ZESTRIL) 10 MG tablet Take 1 tablet (10 mg) by mouth daily 90 tablet 3     metoprolol tartrate (LOPRESSOR) 50 MG tablet TAKE THREE TABLETS BY MOUTH TWICE A  tablet 0     omeprazole (PRILOSEC) 20 MG CR capsule TAKE 1 CAPSULE BY  "MOUTH EVERY DAY 90 capsule 3     simvastatin (ZOCOR) 20 MG tablet Take 1 tablet (20 mg) by mouth At Bedtime 90 tablet 3     torsemide (DEMADEX) 20 MG tablet Take 1 tablet (20 mg) by mouth daily 90 tablet 3     warfarin (COUMADIN) 5 MG tablet TAKE ONE TABLET BY MOUTH EVERY DAY OR PER COAG CLINIC 30 tablet 1       ROS:  Constitutional, HEENT, cardiovascular, pulmonary, GI, , musculoskeletal, neuro, skin, endocrine and psych systems are negative, except as otherwise noted.    OBJECTIVE:     /78 (BP Location: Right arm, Patient Position: Chair, Cuff Size: Adult Large)   Pulse 110   Temp 97.9  F (36.6  C) (Oral)   Resp 16   Wt (!) 162.9 kg (359 lb 3.2 oz)   SpO2 96%   BMI 51.54 kg/m    Body mass index is 51.54 kg/m .  GENERAL: healthy, alert and no distress  EYES: Eyes grossly normal to inspection, PERRL and conjunctivae and sclerae normal  HENT: ear canals and TM's normal, nose and mouth without ulcers or lesions  NECK: no adenopathy, no asymmetry, masses, or scars and thyroid normal to palpation  RESP: lungs clear to auscultation - no rales, rhonchi or wheezes  CV: regular rate and rhythm, normal S1 S2, no S3 or S4, no murmur, click or rub, no peripheral edema and peripheral pulses strong  ABDOMEN: soft, nontender, no hepatosplenomegaly, no masses and bowel sounds normal  MS: no gross musculoskeletal defects noted, no edema  SKIN: no suspicious lesions or rashes  NEURO: Normal strength and tone, mentation intact and speech normal  PSYCH: mentation appears normal, affect normal/bright    Diagnostic Test Results:  none     ASSESSMENT/PLAN:         Heart Failure:   Combined - EF: 35% - 39%; chronic, controlled    BMI:   Estimated body mass index is 51.54 kg/m  as calculated from the following:    Height as of 8/30/18: 1.778 m (5' 10\").    Weight as of this encounter: 162.9 kg (359 lb 3.2 oz).   Weight management plan: Discussed healthy diet and exercise guidelines      1. Type 2 diabetes mellitus with stage " 3 chronic kidney disease, without long-term current use of insulin (H)  Prediabetic   - LDL cholesterol direct  - TSH with free T4 reflex  - Albumin Random Urine Quantitative with Creat Ratio    2. ASVD (arteriosclerotic vascular disease)  asvd prior [facility]  - Comprehensive metabolic panel  - Hemoglobin A1c  - LDL cholesterol direct  - TSH with free T4 reflex  - Albumin Random Urine Quantitative with Creat Ratio  - lisinopril (PRINIVIL/ZESTRIL) 10 MG tablet; Take 1 tablet (10 mg) by mouth daily  Dispense: 90 tablet; Refill: 3  - metoprolol tartrate (LOPRESSOR) 50 MG tablet; TAKE THREE TABLETS BY MOUTH TWICE A DAY  Dispense: 180 tablet; Refill: 0  - simvastatin (ZOCOR) 20 MG tablet; Take 1 tablet (20 mg) by mouth At Bedtime  Dispense: 90 tablet; Refill: 3    3. Cardiomyopathy, nonischemic (H)    - lisinopril (PRINIVIL/ZESTRIL) 10 MG tablet; Take 1 tablet (10 mg) by mouth daily  Dispense: 90 tablet; Refill: 3  - torsemide (DEMADEX) 20 MG tablet; Take 1 tablet (20 mg) by mouth daily  Dispense: 90 tablet; Refill: 3    4. Morbid obesity (H)    - lisinopril (PRINIVIL/ZESTRIL) 10 MG tablet; Take 1 tablet (10 mg) by mouth daily  Dispense: 90 tablet; Refill: 3  - simvastatin (ZOCOR) 20 MG tablet; Take 1 tablet (20 mg) by mouth At Bedtime  Dispense: 90 tablet; Refill: 3  - torsemide (DEMADEX) 20 MG tablet; Take 1 tablet (20 mg) by mouth daily  Dispense: 90 tablet; Refill: 3    5. Chronic atrial fibrillation (H)    - simvastatin (ZOCOR) 20 MG tablet; Take 1 tablet (20 mg) by mouth At Bedtime  Dispense: 90 tablet; Refill: 3    6. Benign essential hypertension  At goal  - lisinopril (PRINIVIL/ZESTRIL) 10 MG tablet; Take 1 tablet (10 mg) by mouth daily  Dispense: 90 tablet; Refill: 3    7. Acute on chronic diastolic congestive heart failure (H)  Stable   - metoprolol tartrate (LOPRESSOR) 50 MG tablet; TAKE THREE TABLETS BY MOUTH TWICE A DAY  Dispense: 180 tablet; Refill: 0  - torsemide (DEMADEX) 20 MG tablet; Take 1 tablet (20  mg) by mouth daily  Dispense: 90 tablet; Refill: 3    9. Gastroesophageal reflux disease with esophagitis and gastritis    - omeprazole (PRILOSEC) 20 MG DR capsule; TAKE 1 CAPSULE BY MOUTH EVERY DAY  Dispense: 90 capsule; Refill: 3    10. Hyperglycemia  Recheck for type 2  - Comprehensive metabolic panel  - Hemoglobin A1c        Fred Del Valel MD  [facility]

## 2019-04-17 LAB
ALBUMIN SERPL-MCNC: 3.7 G/DL (ref 3.4–5)
ALP SERPL-CCNC: 75 U/L (ref 40–150)
ALT SERPL W P-5'-P-CCNC: 34 U/L (ref 0–70)
ANION GAP SERPL CALCULATED.3IONS-SCNC: 7 MMOL/L (ref 3–14)
AST SERPL W P-5'-P-CCNC: 35 U/L (ref 0–45)
BILIRUB SERPL-MCNC: 0.8 MG/DL (ref 0.2–1.3)
BUN SERPL-MCNC: 29 MG/DL (ref 7–30)
CALCIUM SERPL-MCNC: 9 MG/DL (ref 8.5–10.1)
CHLORIDE SERPL-SCNC: 110 MMOL/L (ref 94–109)
CO2 SERPL-SCNC: 24 MMOL/L (ref 20–32)
CREAT SERPL-MCNC: 1.4 MG/DL (ref 0.66–1.25)
CREAT UR-MCNC: 190 MG/DL
GFR SERPL CREATININE-BSD FRML MDRD: 55 ML/MIN/{1.73_M2}
GLUCOSE SERPL-MCNC: 107 MG/DL (ref 70–99)
LDLC SERPL DIRECT ASSAY-MCNC: 68 MG/DL
MICROALBUMIN UR-MCNC: 126 MG/L
MICROALBUMIN/CREAT UR: 66.32 MG/G CR (ref 0–17)
POTASSIUM SERPL-SCNC: 4.2 MMOL/L (ref 3.4–5.3)
PROT SERPL-MCNC: 7.8 G/DL (ref 6.8–8.8)
SODIUM SERPL-SCNC: 141 MMOL/L (ref 133–144)
TSH SERPL DL<=0.005 MIU/L-ACNC: 3.67 MU/L (ref 0.4–4)

## 2019-05-09 ENCOUNTER — TELEPHONE (OUTPATIENT)
Dept: FAMILY MEDICINE | Facility: CLINIC | Age: 59
End: 2019-05-09

## 2019-05-09 NOTE — TELEPHONE ENCOUNTER
Pt calls, has refills, called [place] pharmacy and confirmed, they will arrange refills for pt, pt informed  Mallika Sharif RN, BSN  Message handled by Nurse Triage.

## 2019-06-03 ENCOUNTER — TELEPHONE (OUTPATIENT)
Dept: FAMILY MEDICINE | Facility: CLINIC | Age: 59
End: 2019-06-03

## 2019-06-03 NOTE — TELEPHONE ENCOUNTER
I called patient to remind him that he is due for an INR visit.  Patient states he has to wait until he can get a ride to the [employer] and that may not be until mid to late June.  Patient denies any concerns and has declined, in the past, looking for a clinic closer to him.    Leticia Antunez RN

## 2019-06-10 DIAGNOSIS — I50.33 ACUTE ON CHRONIC DIASTOLIC CONGESTIVE HEART FAILURE (H): ICD-10-CM

## 2019-06-10 DIAGNOSIS — I10 ESSENTIAL HYPERTENSION, BENIGN: ICD-10-CM

## 2019-06-10 DIAGNOSIS — I70.90 ASVD (ARTERIOSCLEROTIC VASCULAR DISEASE): ICD-10-CM

## 2019-06-11 NOTE — TELEPHONE ENCOUNTER
Metoprolol  Routing refill request to provider for review/approval because:  A break in medication    Zhanna Booker, RN, BSN

## 2019-06-12 RX ORDER — METOPROLOL TARTRATE 50 MG
TABLET ORAL
Qty: 180 TABLET | Refills: 0 | Status: SHIPPED | OUTPATIENT
Start: 2019-06-12

## 2019-06-18 ENCOUNTER — TELEPHONE (OUTPATIENT)
Dept: FAMILY MEDICINE | Facility: CLINIC | Age: 59
End: 2019-06-18

## 2019-06-18 NOTE — TELEPHONE ENCOUNTER
Spoke with patient:     S: He rec'd a call from Leticia Antunez, RN notifying that he is late for INR check (Due 5/28)     B: Last INR 4/16/19    Pt would like to switch to a clinic in [place], [place] which is closer for him- for PCP and INR checks. (does not plan to come to [facility] clinic for follow-up appts with Dr. Del Valle)     Is car is not trustworthy to drive from [place] any longer (needs to find rides)     To PCP: Pt is requesting INR orders to be sent to:     [facility]   Fax: [phone]    Discussed with patient that when he establishes with a new PCP, these orders will have to come from new PCP, if he does not plan follow-up with Dr. Del Valle.     Leticia and Dr. Del Valle- please advise if pt can have orders for INR's to be checked in [place] in the meantime.       Best call back for patient: [phone]  Detailed VM: OK

## 2019-06-18 NOTE — LETTER
[facility]  [address] [place], [address]  [phone]        June 19, 2019    Nikunj Anthony                                                                                                                                                       [id number] [facility] [address]        External Order:   INR check at [facility] FAX:  [phone]    One time order call or fax results to [facility] INR:  [phone] FAX,    [phone]:CALL        Fred Del Valle MD

## 2019-06-18 NOTE — LETTER
[address] [id number] [phone]          June 28, 2019    Nikunj Anthony                                                                                                                     [id number] [address]  [facility] [address]            To Whom it may concern:    Please draw INR x 1 (patient to call and schedule INR lab appointment).  Patient is to then establish care with a provider and an INR nurse at his earliest convenience.    Patient takes Warfarin for Atrial Fibrillation/Diagnosis Code: I48.91.    Please call INR results to Leticia at: [phone].      Sincerely,         Fred Antunez, [facility]/Anticoagulation Clinic

## 2019-06-25 NOTE — TELEPHONE ENCOUNTER
I spoke to patient, he has not scheduled an INR appt. At the lab in [place].  I encouraged patient to call [place] to scheduled lab INR appt. And to call INR clinic back if he has any difficulties.    Leticia Antunez RN

## 2019-06-28 NOTE — TELEPHONE ENCOUNTER
I spoke to patient, he informed me that [facility] does not have enough information on the INR order that was faxed from Dr. Del Valle.    INR order re-faxed (see letters) to: [phone].  Patient is hopeful he can have INR drawn on 07/01 or 07/02/19.    Leticia Antunez RN

## 2019-07-08 ENCOUNTER — ANTICOAGULATION THERAPY VISIT (OUTPATIENT)
Dept: NURSING | Facility: CLINIC | Age: 59
End: 2019-07-08
Payer: COMMERCIAL

## 2019-07-08 ENCOUNTER — ANTICOAGULATION THERAPY VISIT (OUTPATIENT)
Dept: FAMILY MEDICINE | Facility: CLINIC | Age: 59
End: 2019-07-08

## 2019-07-08 DIAGNOSIS — Z79.01 LONG TERM CURRENT USE OF ANTICOAGULANTS WITH INR GOAL OF 2.0-3.0: ICD-10-CM

## 2019-07-08 DIAGNOSIS — Z79.01 LONG TERM CURRENT USE OF ANTICOAGULANT THERAPY: ICD-10-CM

## 2019-07-08 DIAGNOSIS — I48.20 CHRONIC ATRIAL FIBRILLATION (H): ICD-10-CM

## 2019-07-08 LAB — INR PPP: 2.1 (ref 0.8–1.1)

## 2019-07-08 PROCEDURE — 99207 ZZC NO CHARGE NURSE ONLY: CPT

## 2019-07-08 RX ORDER — WARFARIN SODIUM 5 MG/1
TABLET ORAL
Qty: 30 TABLET | Refills: 0 | Status: SHIPPED | OUTPATIENT
Start: 2019-07-08

## 2019-07-08 NOTE — PROGRESS NOTES
ANTICOAGULATION FOLLOW-UP CLINIC VISIT    Patient Name:  Nikunj Anthony  Date:  2019  Contact Type:  Telephone    SUBJECTIVE:  Patient Findings     Comments:   Patient informed me that he had his INR drawn on 19 in [place] but I had not gotten results.  I called [facility] lab at: [phone] to inquire, INR was drawn on  but it had not been called or faxed to [facility].  Patient aware to continue same weekly Warfarin dose and to establish care in [place] before next INR is due in August.        Clinical Outcomes     Negatives:   Major bleeding event, Thromboembolic event, Anticoagulation-related hospital admission, Anticoagulation-related ED visit, Anticoagulation-related fatality    Comments:   Patient informed me that he had his INR drawn on 19 in [place] but I had not gotten results.  I called [facility] lab at: [phone] to inquire, INR was drawn on  but it had not been called or faxed to [facility].  Patient aware to continue same weekly Warfarin dose and to establish care in [place] before next INR is due in August.           OBJECTIVE    INR   Date Value Ref Range Status   2019 2.1 (A) 0.8 - 1.1 Final       ASSESSMENT / PLAN  INR assessment THER    Recheck INR In: 6 WEEKS    INR Location Outside lab      Anticoagulation Summary  As of 2019    INR goal:   2.0-3.0   TTR:   89.6 % (3.2 y)   INR used for dosin.1 (2019)   Warfarin maintenance plan:   5 mg (5 mg x 1) every day   Full warfarin instructions:   5 mg every day   Weekly warfarin total:   35 mg   No change documented:   Leticia Antunez, RN   Plan last modified:   Leticia Antunez RN (2016)   Next INR check:   2019   Priority:   INR   Target end date:   Indefinite    Indications    Long-term (current) use of anticoagulants [Z79.01] [Z79.01]  Atrial fibrillation with rapid ventricular response (H) (Resolved) [I48.91]             Anticoagulation Episode Summary     INR check location:        Preferred lab:       Send INR reminders to:   [facility]    Comments:         Anticoagulation Care Providers     Provider Role Specialty Phone number    Fred Del Valle MD [facility] [phone]            See the Encounter Report to view Anticoagulation Flowsheet and Dosing Calendar (Go to Encounters tab in chart review, and find the Anticoagulation Therapy Visit)        Leticia Antunez RN

## 2019-07-08 NOTE — TELEPHONE ENCOUNTER
I spoke to patient, INR was drawn on 07/01 at [facility] lab--see [facility] encounter.  Patient will establish care in [place] for future INR and MD visits so I removed him from [facility] roster.    Leticia Antunez RN

## 2019-07-17 ENCOUNTER — TELEPHONE (OUTPATIENT)
Dept: FAMILY MEDICINE | Facility: CLINIC | Age: 59
End: 2019-07-17

## 2019-10-24 ENCOUNTER — TELEPHONE (OUTPATIENT)
Dept: FAMILY MEDICINE | Facility: CLINIC | Age: 59
End: 2019-10-24

## 2019-10-24 NOTE — TELEPHONE ENCOUNTER
Fax from Justin, deangelo PA for omeprazole, only COVERS 120 capsules per 365 DAYS, pt has previous PA , routed to HORTENCIA CHINO MD, please confirm PA if ongoing and route to PA pool    Prior Authorization Approval     Authorization Effective Date: 2018  Authorization Expiration Date: 2019    Pharmacy Benefit Information:    Provider: KEVIN  Phone #: [phone]  ID#: [id number]  BIN: [id number]  GRP: [id number]  PCN: ASHLI  Medication/SIG: OMEPRAZOLE 20 MG    Mallika Sharif, RN, BSN  Message handled by Nurse Triage.

## 2019-10-25 NOTE — TELEPHONE ENCOUNTER
Central Prior Authorization Team   Phone: [phone]    PA Initiation    Medication: omeprazole  Insurance Company: [facility] - Phone [phone] Fax [phone]  Pharmacy Filling the Rx: [facility] - [place] [place] [address]  Filling Pharmacy Phone: [phone]  Filling Pharmacy Fax: [phone]  Start Date: 10/25/2019

## 2019-10-28 NOTE — TELEPHONE ENCOUNTER
Central Prior Authorization Team   Phone: [phone]    Prior Authorization Approval    Authorization Effective Date: 7/26/2019  Authorization Expiration Date: 10/26/2020  Medication: omeprazole  Approved Dose/Quantity:   Reference #: [id number]   Insurance Company: Blue Plus [facility] - Phone [phone] Fax [phone]  Expected CoPay:       CoPay Card Available:      Foundation Assistance Needed:    Which Pharmacy is filling the prescription (Not needed for infusion/clinic administered): [facility] - [address]  Pharmacy Notified: Yes  Patient Notified: Yes  **Instructed pharmacy to notify patient when script is ready to /ship.**

## 2020-03-05 ENCOUNTER — TELEPHONE (OUTPATIENT)
Dept: FAMILY MEDICINE | Facility: CLINIC | Age: 60
End: 2020-03-05

## 2020-03-05 NOTE — TELEPHONE ENCOUNTER
Summary:    Patient is due/failing the following:   Eye exam, A1C, COLONOSCOPY and PHYSICAL    Reviewed:  [x] CARE EVERYWHERE  [x] LAST OV NOTE INCLUDING ENDO  [x] FYI TAB  [x] LAST PANEL ENCOUNTER  [x] FUTURE APTS  [x] MYCHART STATUS   [x] IMMUNIZATIONS  Action needed:   Patient needs referral/order: colonoscopy and Eye exam    Type of outreach:    Phone, left message for patient to call back.                                                                                Lo Tate MA on 3/5/2020 at 10:14 AM

## 2020-03-05 NOTE — TELEPHONE ENCOUNTER
03/05/2020    Pt called back stated that he dose not come to [facility] anymore, due to a move. Pt stated that he dose not want any more calls.

## 2020-05-11 DIAGNOSIS — I70.90 ASVD (ARTERIOSCLEROTIC VASCULAR DISEASE): ICD-10-CM

## 2020-05-11 DIAGNOSIS — I42.8 CARDIOMYOPATHY, NONISCHEMIC (H): ICD-10-CM

## 2020-05-11 DIAGNOSIS — I48.20 CHRONIC ATRIAL FIBRILLATION (H): ICD-10-CM

## 2020-05-11 DIAGNOSIS — E66.01 MORBID OBESITY (H): ICD-10-CM

## 2020-05-11 DIAGNOSIS — I10 BENIGN ESSENTIAL HYPERTENSION: ICD-10-CM

## 2020-05-12 ENCOUNTER — TELEPHONE (OUTPATIENT)
Dept: FAMILY MEDICINE | Facility: CLINIC | Age: 60
End: 2020-05-12

## 2020-05-12 DIAGNOSIS — I10 BENIGN ESSENTIAL HYPERTENSION: ICD-10-CM

## 2020-05-12 DIAGNOSIS — I70.90 ASVD (ARTERIOSCLEROTIC VASCULAR DISEASE): ICD-10-CM

## 2020-05-12 DIAGNOSIS — I42.8 CARDIOMYOPATHY, NONISCHEMIC (H): ICD-10-CM

## 2020-05-12 DIAGNOSIS — E66.01 MORBID OBESITY (H): ICD-10-CM

## 2020-05-12 RX ORDER — LISINOPRIL 10 MG/1
10 TABLET ORAL DAILY
Qty: 90 TABLET | Refills: 3 | OUTPATIENT
Start: 2020-05-12

## 2020-05-12 RX ORDER — LISINOPRIL 10 MG/1
10 TABLET ORAL DAILY
Qty: 90 TABLET | Refills: 3 | Status: SHIPPED | OUTPATIENT
Start: 2020-05-12

## 2020-05-12 RX ORDER — SIMVASTATIN 20 MG
20 TABLET ORAL AT BEDTIME
Qty: 30 TABLET | Refills: 0 | Status: SHIPPED | OUTPATIENT
Start: 2020-05-12

## 2020-05-12 NOTE — TELEPHONE ENCOUNTER
Medication is being filled for 1 time refill only due to:  Patient needs to be seen because it has been more than one year since last visit.     Please call to schedule appt.    Juan Francisco Fernandez RN, BSN

## 2020-06-01 DIAGNOSIS — I42.8 CARDIOMYOPATHY, NONISCHEMIC (H): ICD-10-CM

## 2020-06-01 DIAGNOSIS — E66.01 MORBID OBESITY (H): ICD-10-CM

## 2020-06-01 DIAGNOSIS — I50.33 ACUTE ON CHRONIC DIASTOLIC CONGESTIVE HEART FAILURE (H): ICD-10-CM

## 2020-06-02 RX ORDER — TORSEMIDE 20 MG/1
20 TABLET ORAL DAILY
Qty: 90 TABLET | Refills: 3 | OUTPATIENT
Start: 2020-06-02

## 2020-06-02 NOTE — TELEPHONE ENCOUNTER
Routing to MA/TC pool. The Pt is due for an OV with PCP. Please call and help them schedule. Please assess if the Pt has enough medication to get them through until their upcoming future visit, or if they need a refill.     Please route back to refill pool with response    Meaghan Foster RN on 6/2/2020 at 8:17 AM

## 2020-06-02 NOTE — TELEPHONE ENCOUNTER
Called pt. Pt is no longer seeing .he moved to [place].Dinora Brown MA  [facility].

## 2021-05-17 DIAGNOSIS — I70.90 ASVD (ARTERIOSCLEROTIC VASCULAR DISEASE): ICD-10-CM

## 2021-05-17 DIAGNOSIS — E66.01 MORBID OBESITY (H): ICD-10-CM

## 2021-05-17 DIAGNOSIS — I10 BENIGN ESSENTIAL HYPERTENSION: ICD-10-CM

## 2021-05-17 DIAGNOSIS — I42.8 CARDIOMYOPATHY, NONISCHEMIC (H): ICD-10-CM

## 2021-05-18 NOTE — TELEPHONE ENCOUNTER
Routing refill request to provider for review/approval because:  Labs out of range:  creatinine  Labs not current:  BP, potassium  Patient needs to be seen because it has been more than 1 year since last office visit.    Creatinine   Date Value Ref Range Status   04/16/2019 1.40 (H) 0.66 - 1.25 mg/dL Clau SCANLON RN

## 2021-05-19 ENCOUNTER — NURSE TRIAGE (OUTPATIENT)
Dept: NURSING | Facility: CLINIC | Age: 61
End: 2021-05-19

## 2021-05-19 RX ORDER — LISINOPRIL 10 MG/1
TABLET ORAL
Qty: 90 TABLET | Refills: 3 | OUTPATIENT
Start: 2021-05-19

## 2021-05-19 NOTE — TELEPHONE ENCOUNTER
See last refill request in 2020.     Dinora Brown         10:23 AM  Note     Called pt. Pt is no longer seeing .he moved to [place].Dinora Brown MA  [facility].                Denied.     -pricilla ferguson, pac

## 2022-06-09 NOTE — LETTER
8/30/2018      Fred Del Valle MD  [address] [facility] [address]      RE: Nikunj Anthony       Dear Colleague,    I had the pleasure of seeing Nikunj Anthony in the [facility].    Service Date: 08/30/2018      HISTORY OF PRESENT ILLNESS:  Mr. Anthony is a 57-year-old gentleman with a history of morbid obesity, permanent atrial fibrillation, ischemic cardiomyopathy with an ejection fraction of 35% who has refused a defibrillator on multiple occasions who returns in annual followup.      The patient was seen by Dr. Mcdowell in June of this year following a presentation for acute decompensated heart failure to an outside institution.  At that time, the patient had been eating a high sodium diet.  Dr. Mcdowell discussed diet with the patient who did not appear interested in changing his diet.  He also continues to refuse to undergo a sleep study.      The patient has had a cardiac catheterization showing no evidence of coronary disease in the past.  He had a markedly elevated LVEDP and has been on torsemide since.  This summer was his most recent hospitalization in some time for heart failure.      At today's visit, the patient states that he has been stable.  His weight is 361 pounds, up slightly from 358 pounds when we saw him last.  He states that he has been taking his cardiac medications as prescribed.  He has stable dyspnea with exertion, but denies any shortness of breath at rest, orthopnea or paroxysmal nocturnal dyspnea.  However, the patient does sleep in a recliner, so it is difficult to determine if he is truly not having orthopnea.      Please see my separate note with his full physical examination.      IMPRESSION AND PLAN:  Mr. Anthony is a pleasant 57-year-old gentleman with a nonischemic cardiomyopathy with an ejection fraction of 35% with a recent admission for acute decompensated heart failure due to dietary noncompliance.  Fortunately, he has been  Refill passed per [facility], Instilling Values protocol. Requested Prescriptions   Pending Prescriptions Disp Refills    escitalopram (LEXAPRO) 5 MG Oral Tab 30 tablet 0     Sig: Take 1 tablet (5 mg total) by mouth daily.         Psychiatric Non-Scheduled (Anti-Anxiety) Passed - 6/8/2022  7:24 AM        Passed - Appointment in last 6 or next 3 months              Recent Outpatient Visits              1 month ago Stress disorder, acute    Shaye Doty [place], MD    Telemedicine    3 months ago Annual physical exam    Shiva Mercado MD    Office Visit    12 months ago Timur Luque MD    Office Visit    1 year ago Viral conjunctivitis    Shaye Doty [place], MD    Office Visit    2 years ago Upper respiratory tract infection, unspecified type    Shiva Mercado MD    Office Visit            Future Appointments         Provider Department Appt Notes    In 8 months Sergei Lei MD [place] [employer], [facility], [address] [facility] px compliant with his cardiac medications.  At this point, I would continue his cardiac regimen unchanged.  He continues to refuse a defibrillator or a sleep study to evaluate for obstructive sleep apnea.  The patient sees Dr. Hernandez once a year in the primary care clinic.  At this time, I believe that it would be reasonable for the patient to follow up with Cardiology on a p.r.n. basis.  I have instructed the patient to remain on his medications unchanged until he sees Dr. Del Valle.         MILA MEYERS MD             D: 2018   T: 2018   MT: [place]      Name:     CT GRISSOM   MRN:      [id number]        Account:      [id number]   :      1960           Service Date: 2018      Document: [id number]         Outpatient Encounter Prescriptions as of 2018   Medication Sig Dispense Refill     acetaminophen 650 MG TABS Take 650 mg by mouth every 4 hours as needed for mild pain 100 tablet 0     ASPIRIN NOT PRESCRIBED (INTENTIONAL) Please choose reason not prescribed, below 0 each 0     lisinopril (PRINIVIL/ZESTRIL) 10 MG tablet Take 1 tablet (10 mg) by mouth daily 90 tablet 3     metoprolol tartrate (LOPRESSOR) 50 MG tablet TAKE THREE TABLETS BY MOUTH TWICE A  tablet 1     omeprazole (PRILOSEC) 20 MG CR capsule TAKE 1 CAPSULE BY MOUTH EVERY DAY 90 capsule 3     simvastatin (ZOCOR) 20 MG tablet Take 1 tablet (20 mg) by mouth At Bedtime 90 tablet 3     torsemide (DEMADEX) 20 MG tablet Take 1 tablet (20 mg) by mouth daily 90 tablet 3     warfarin (COUMADIN) 5 MG tablet TAKE ONE TABLET BY MOUTH EVERY DAY OR PER COAG CLINIC 30 tablet 3     No facility-administered encounter medications on file as of 2018.        Again, thank you for allowing me to participate in the care of your patient.      Sincerely,    Mila Meyers MD     [facility]
